# Patient Record
(demographics unavailable — no encounter records)

---

## 2024-10-14 NOTE — PLAN
[No] : No [Medication education provided] : Medication education provided. [Rationale for medication choices, possible risks/precautions, benefits, alternative treatment choices, and consequences of non-treatment discussed] : Rationale for medication choices, possible risks/precautions, benefits, alternative treatment choices, and consequences of non-treatment discussed with patient/family/caregiver  [FreeTextEntry5] : Psychoeducation and supportive therapy provided and discussed rationale for recommended med changes Continue venlafaxine extended release to 112.5 mg daily.  Educated patient of importance of remaining abstinent from drugs and alcohol.  Continue therapy-outside. Emergency procedures were discussed: pt. educated to call 911 or go to nearest ER for worsening of symptoms/suicidal/homicidal ideation.  RTC in 2.5-3 months or early as needed  Patient given opportunity to ask questions and their questions were answered, and they expressed understanding and agreement with above plan.  I stop checked: Reference #: [id number]  Practitioner Count: 0 Pharmacy Count: 0 Current Opioid Prescriptions: 0 Current Benzodiazepine Prescriptions: 0 Current Stimulant Prescriptions: 0   Patient Demographic Information (PDI)     PDI	First Name	Last Name	Birth Date	Gender	Street Address	[facility]	Zip Code A	Selina Martinez	03/07/1995	Female	[address] [facility]	[address] [place]	[facility]	03/07/1995	Female	[address] [facility]	[address]  Prescription Information    PDI Filter:   PDI	My Rx	Current Rx	Drug Type	Rx Written	Rx Dispensed	Drug	Quantity	Days Supply	Prescriber Name	Prescriber SANDRA #	Payment Method	Dispenser A	Y	N	S	04/29/2024	05/23/2024	dexmethylphenidate er 5 mg cap	21	21	Bianca Portillo	[id number]	Other	[id number] B	Y	N	S	01/29/2024	01/30/2024	dextroamp-amphet er 10 mg cap	15	15	Bianca Portillo	[id number]	Insurance	[id number] B	Y	N	S	11/21/2023	11/25/2023	concerta er 18 mg tablet	30	30	Bianca Portillo	[id number]	Insurance	[facility] #[id number]

## 2024-10-14 NOTE — HISTORY OF PRESENT ILLNESS
[FreeTextEntry1] : Med changes made on last visit: DC trazodone, and clonazepam (6 weeks pregnant), use antihistamine prn for insomnia Patient states that she is now 18 weeks pregnant and her due date is March 16.  Patient states that she is waiting for fetal maternal medicine visit for fetal anatomy scan before she shares with her work about the pregnancy. Patient reported that her nausea had gone down significantly and however she is "very dry".  Patient states that she is trying to drink a lot of water but she developed dandruff and instead of hair pulling now she is picking at her scalp.  Patient states that she is not particularly itching but it appears to be hair pulling replaced by skin picking and she reports that when she is at school or distracted by some things she does not pick at her scalp.  Patient reported that that she is trying to wear When she is not in school and tried to wear gloves but she could not keep them on because she needed to work and do other things.  Patient states that her therapist suggested some other sensory activities like getting beads etc. that she can have in her hand to replace the sensation.  Patient reported that she tried some sensory toys but that did not work but is going to try what her therapist suggested. Patient reported that she does feel some anxiety here and there but that she reported no excessive worrying or feeling overwhelmed and she reported also mood is stable very rarely she feels sad or depressed.  Patient denied pervasive sad mood, anhedonia, denied feeling hopeless or helpless and denied passive or active SI.  Patient reported that often her depression anxiety symptoms were triggered by interactions with her mother who is now in therapy and she feels that is helping their relationship. Patient reported that she is using doxylamine at night to help her with sleep. Side effects from meds: none Adherence to med regimen: Good ETOH: Denied excess Cannabis use/abuse: denied  Illicit drug use/abuse: denied Medical update since last visit: No new medical issues, no new medication other than dandruff Changes in psychosocial history since last visit: 1st pregnancy

## 2024-10-14 NOTE — REASON FOR VISIT
[Patient preference] : as per patient preference [Telehealth (audio & video) - Individual/Group] : This visit was provided via telehealth using real-time 2-way audio visual technology. [Medical Office: ([facility])___] : The provider was located at the medical office in [unfilled]. [Home] : The patient, [unfilled], was located at home, [unfilled], at the time of the visit. [Verbal consent obtained from patient/other participant(s)] : Verbal consent for telehealth/telephonic services obtained from patient/other participant(s) [Patient] : Patient [Patient's space is appropriate for telehealth and maintains privacy/confidentiality.] : Patient's space is appropriate for telehealth and maintains privacy/confidentiality. [Participant(s) identity verified] : Participant(s) identity verified. [FreeTextEntry1] : anxiety

## 2024-10-14 NOTE — PLAN
[No] : No [Medication education provided] : Medication education provided. [Rationale for medication choices, possible risks/precautions, benefits, alternative treatment choices, and consequences of non-treatment discussed] : Rationale for medication choices, possible risks/precautions, benefits, alternative treatment choices, and consequences of non-treatment discussed with patient/family/caregiver  [FreeTextEntry5] : Psychoeducation and supportive therapy provided and discussed rationale for recommended med changes Continue venlafaxine extended release to 112.5 mg daily.  Educated patient of importance of remaining abstinent from drugs and alcohol.  Continue therapy-outside. Emergency procedures were discussed: pt. educated to call 911 or go to nearest ER for worsening of symptoms/suicidal/homicidal ideation.  RTC in 2.5-3 months or early as needed  Patient given opportunity to ask questions and their questions were answered, and they expressed understanding and agreement with above plan.  I stop checked: Reference #: [id number]  Practitioner Count: 0 Pharmacy Count: 0 Current Opioid Prescriptions: 0 Current Benzodiazepine Prescriptions: 0 Current Stimulant Prescriptions: 0   Patient Demographic Information (PDI)     PDI	First Name	Last Name	Birth Date	Gender	Street Address	[facility]	Zip Code A	Selina Martinez	03/07/1995	Female	[address] [facility]	[address] [place]	[facility]	03/07/1995	Female	[address] [facility] [facility]	[address]  Prescription Information    PDI Filter:   PDI	My Rx	Current Rx	Drug Type	Rx Written	Rx Dispensed	Drug	Quantity	Days Supply	Prescriber Name	Prescriber SANDRA #	Payment Method	Dispenser A	Y	N	S	04/29/2024	05/23/2024	dexmethylphenidate er 5 mg cap	21	21	Bianca Portillo	[id number]	Other	[id number] B	Y	N	S	01/29/2024	01/30/2024	dextroamp-amphet er 10 mg cap	15	15	Bianca Portillo	[id number]	Insurance	[facility][id number] B	Y	N	S	11/21/2023	11/25/2023	concerta er 18 mg tablet	30	30	Bianca Portillo	[id number]	Insurance	[facility] #[id number]

## 2024-10-14 NOTE — DISCUSSION/SUMMARY
[FreeTextEntry1] : The patient is a 28 yo woman with hx of sx consistent with diagnosis of MDD, and DARIA, on Lexapro 5 mg/day with good effect, reports increased anxiety since NOV, 2021 since she started a job that she boateng snot like.  5/23/2022: Patient reports she continues to feel anxious, and no side effects from Lexapro, will increase dose.   6/21/2022: Patient reports slight reduced of anxiety sx and is hopeful that in summer and once she starts new job she will be less anxious. We will continue same dose of Lexapro and monitor for sx stability.   09/27/2022: Patient reported significant improvement of anxiety symptoms since last visit, did not need to increase the Lexapro dose from 10 to 15 mg as discussed previously.   1/18/2023: Patient reported that for the past 2 weeks she started feeling anxious and having some trouble sleeping and getting upset easily though triggered by recent interpersonal stressor it appears that some of the symptoms have been coming back and we discussed increasing the Lexapro dose could help reduce the intensity of these symptoms so she can continue to learn coping skills in therapy to manage the stressors.   3/22/2023: Patient reported increasing the Lexapro dose has helped with the anxiety symptoms she was experiencing and also she realized that triggers that are causing increasing the anxiety and that she is working with her therapist learning coping skills.  5/2/2023: Patient states that she felt she was doing okay with the last dose increase with the Lexapro but for the past 2 weeks anticipating the work she has to do and on the day of the art show how little time she has she has been feeling more overwhelmed and anxious  5/15/2023: Patient reported that she did not have Klonopin until yesterday and she took half a tablet without much benefit but also did not feel tired or fatigued with a half a tablet.  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  6/19/2023: Patient continues to remain depressed and anxious with the anxiety symptoms at 8 out of 10, 10 being the worst despite being on full dose of Lexapro 20 mg daily and buspirone 20 mg daily and at this time would warrant switching to a different medication.  8/7/2023: Patient reported improving symptoms of depression and anxiety and so far has been tolerating venlafaxine without adverse effects.   09/27/2023: The patient reported improving symptoms of depression and anxiety, no adverse effects from venlafaxine. she is still getting distracted and starts a task and either gets distracted when she interrupted by a student or when she sees another thing she moves on and forgets the previous task that she was doing. She reported she had trouble focusing since childhood and the Adult ADHD screen questionnaire she completed with therapist meets threshold for ADHD.   11/14/2023: Patient reported sustained improvement of symptoms of depression, anxiety symptoms fluctuate with interpersonal stressors and conflict and stress at work mostly the past week otherwise she has been doing good and also even in the past week she feels that she has been is able to use coping skills effectively to cope with the stressors and learn to set boundaries with her mother.  Patient reported she continues to have trouble focusing and gets distracted easily not completing tasks and also not focusing on conversations, Vyvanse was giving her headaches so she stopped taking it she also has TMJ pain will be getting an guard to help her with the pain.  12/20/2023: The patient states even with Concerta she experienced increased heart rate and is no longer taking it and prefers a trial with non-stimulant for ADHD, and she feels her depression and general anxiety symptoms are in good control.  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  Patient had increased heart rate with Concerta and prefer to use use a nonstimulant but not finding it effective, and agreeable to trial with Adderall.  2/27/2024: Patient reported that taking Adderall only once a day before she came down with sinus infection which has been continuous and not resolving so she did not want to take Adderall when she had headaches and nasal congestion and 1 day that she took she experienced a slight increase in heart rate but overall tolerated it well.  Patient's concerns about risk versus benefits of different medication since she is currently prescribed on the pregnancy were discussed and advised patient to think more about her preferences and risks and benefits and discussed those with the OB/GYN as well and we can plan med changes during pregnancy and postpartum period accordingly.  4/9/2024: Patient did not tolerate Adderall and stopped taking it.  Depression and anxiety symptoms are under good control with the current medications however she continues to have trouble focusing and would like to try another stimulant but states that when she is pregnant she would like to avoid that and understands the risks and benefits of taking other psychotropic medications during pregnancy.  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  8/7/2024: Patient is 6 weeks pregnant and experiencing nausea otherwise overall depression and anxiety symptoms continue to remain stable.  10/14/2024: The patient is now 18 weeks pregnant morning sickness improved overall depression anxiety symptoms continue to remain stable she reported hair pulling is now replaced by skin picking especially the scalp since she has developed some dandruff and we talked about different strategies including oncological but considering risk versus benefits advised patient to continue to work with her therapist and incorporate behavior changes to help manage these symptoms.

## 2024-10-14 NOTE — PHYSICAL EXAM
[None] : none [Average] : average [Cooperative] : cooperative [Euthymic] : euthymic [Clear] : clear [Linear/Goal Directed] : linear/goal directed [None Reported] : none reported [WNL] : within normal limits [de-identified] : full range  [FreeTextEntry7] : No SI/HI

## 2024-10-14 NOTE — PHYSICAL EXAM
[None] : none [Average] : average [Cooperative] : cooperative [Euthymic] : euthymic [Clear] : clear [Linear/Goal Directed] : linear/goal directed [None Reported] : none reported [WNL] : within normal limits [de-identified] : full range  [FreeTextEntry7] : No SI/HI

## 2024-10-14 NOTE — DISCUSSION/SUMMARY
[FreeTextEntry1] : The patient is a 26 yo woman with hx of sx consistent with diagnosis of MDD, and DARIA, on Lexapro 5 mg/day with good effect, reports increased anxiety since NOV, 2021 since she started a job that she boateng snot like.  5/23/2022: Patient reports she continues to feel anxious, and no side effects from Lexapro, will increase dose.   6/21/2022: Patient reports slight reduced of anxiety sx and is hopeful that in summer and once she starts new job she will be less anxious. We will continue same dose of Lexapro and monitor for sx stability.   09/27/2022: Patient reported significant improvement of anxiety symptoms since last visit, did not need to increase the Lexapro dose from 10 to 15 mg as discussed previously.   1/18/2023: Patient reported that for the past 2 weeks she started feeling anxious and having some trouble sleeping and getting upset easily though triggered by recent interpersonal stressor it appears that some of the symptoms have been coming back and we discussed increasing the Lexapro dose could help reduce the intensity of these symptoms so she can continue to learn coping skills in therapy to manage the stressors.   3/22/2023: Patient reported increasing the Lexapro dose has helped with the anxiety symptoms she was experiencing and also she realized that triggers that are causing increasing the anxiety and that she is working with her therapist learning coping skills.  5/2/2023: Patient states that she felt she was doing okay with the last dose increase with the Lexapro but for the past 2 weeks anticipating the work she has to do and on the day of the art show how little time she has she has been feeling more overwhelmed and anxious  5/15/2023: Patient reported that she did not have Klonopin until yesterday and she took half a tablet without much benefit but also did not feel tired or fatigued with a half a tablet.  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  6/19/2023: Patient continues to remain depressed and anxious with the anxiety symptoms at 8 out of 10, 10 being the worst despite being on full dose of Lexapro 20 mg daily and buspirone 20 mg daily and at this time would warrant switching to a different medication.  8/7/2023: Patient reported improving symptoms of depression and anxiety and so far has been tolerating venlafaxine without adverse effects.   09/27/2023: The patient reported improving symptoms of depression and anxiety, no adverse effects from venlafaxine. she is still getting distracted and starts a task and either gets distracted when she interrupted by a student or when she sees another thing she moves on and forgets the previous task that she was doing. She reported she had trouble focusing since childhood and the Adult ADHD screen questionnaire she completed with therapist meets threshold for ADHD.   11/14/2023: Patient reported sustained improvement of symptoms of depression, anxiety symptoms fluctuate with interpersonal stressors and conflict and stress at work mostly the past week otherwise she has been doing good and also even in the past week she feels that she has been is able to use coping skills effectively to cope with the stressors and learn to set boundaries with her mother.  Patient reported she continues to have trouble focusing and gets distracted easily not completing tasks and also not focusing on conversations, Vyvanse was giving her headaches so she stopped taking it she also has TMJ pain will be getting an guard to help her with the pain.  12/20/2023: The patient states even with Concerta she experienced increased heart rate and is no longer taking it and prefers a trial with non-stimulant for ADHD, and she feels her depression and general anxiety symptoms are in good control.  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  Patient had increased heart rate with Concerta and prefer to use use a nonstimulant but not finding it effective, and agreeable to trial with Adderall.  2/27/2024: Patient reported that taking Adderall only once a day before she came down with sinus infection which has been continuous and not resolving so she did not want to take Adderall when she had headaches and nasal congestion and 1 day that she took she experienced a slight increase in heart rate but overall tolerated it well.  Patient's concerns about risk versus benefits of different medication since she is currently prescribed on the pregnancy were discussed and advised patient to think more about her preferences and risks and benefits and discussed those with the OB/GYN as well and we can plan med changes during pregnancy and postpartum period accordingly.  4/9/2024: Patient did not tolerate Adderall and stopped taking it.  Depression and anxiety symptoms are under good control with the current medications however she continues to have trouble focusing and would like to try another stimulant but states that when she is pregnant she would like to avoid that and understands the risks and benefits of taking other psychotropic medications during pregnancy.  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  8/7/2024: Patient is 6 weeks pregnant and experiencing nausea otherwise overall depression and anxiety symptoms continue to remain stable.  10/14/2024: The patient is now 18 weeks pregnant morning sickness improved overall depression anxiety symptoms continue to remain stable she reported hair pulling is now replaced by skin picking especially the scalp since she has developed some dandruff and we talked about different strategies including oncological but considering risk versus benefits advised patient to continue to work with her therapist and incorporate behavior changes to help manage these symptoms.

## 2024-10-14 NOTE — REASON FOR VISIT
[Patient preference] : as per patient preference [Telehealth (audio & video) - Individual/Group] : This visit was provided via telehealth using real-time 2-way audio visual technology. [Medical Office: ([facility])___] : The provider was located at the medical office in [unfilled]. [Home] : The patient, [unfilled], was located at home, [unfilled], at the time of the visit. [Verbal consent obtained from patient/other participant(s)] : Verbal consent for telehealth/telephonic services obtained from patient/other participant(s) [Patient] : Patient [Patient's space is appropriate for telehealth and maintains privacy/confidentiality.] : Patient's space is appropriate for telehealth and maintains privacy/confidentiality. [Participant(s) identity verified] : Participant(s) identity verified. [FreeTextEntry1] : anxiety

## 2024-12-02 NOTE — PHYSICAL EXAM
[None] : none [Average] : average [Cooperative] : cooperative [Euthymic] : euthymic [Clear] : clear [Linear/Goal Directed] : linear/goal directed [None Reported] : none reported [WNL] : within normal limits [de-identified] : full range  [FreeTextEntry7] : No SI/HI

## 2024-12-02 NOTE — HISTORY OF PRESENT ILLNESS
[FreeTextEntry1] : Med changes made on last visit: Patient is using doxylamine-antihistamine for insomnia during pregnancy.  Patient reports that she is now 25 weeks pregnant, due date is March 16.  Patient reported that she is overall doing.  She denied pervasive sad mood or anhedonia.  Patient reports that she still continues to pick at her scalp and this is more at home not so much at work when she is distracted so she is trying different ways to keep her hands busy not always working but continues to speak with her therapist.  Patient reported that lately her appetite is different she gets full easily but also feels hungry again.  Patient reported her sleep is okay and she is using antihistamine doxylamine with good effect.  Patient states that the stress at work is about the same no major changes and reports that for Thanksgiving mother went to spend holiday weekend with the sister she ended up going to her friend's house and felt better not having to do holiday with the family.  Patient reported that she does feel some anxiety here and there but that she reported no excessive worrying or feeling overwhelmed and she reported also mood is stable very rarely she feels sad or depressed.  Patient denied pervasive sad mood, anhedonia, denied feeling hopeless or helpless and denied passive or active SI.  Patient reported that often her depression anxiety symptoms were triggered by interactions with her mother who is now in therapy, and she feels that is helping their relationship. Patient reported that she is using doxylamine at night to help her with sleep. Side effects from meds: none Adherence to med regimen: Good ETOH: Denied excess Cannabis use/abuse: denied  Illicit drug use/abuse: denied Medical update since last visit: No new medical issues, no new medication other than dandruff Changes in psychosocial history since last visit: 1st pregnancy

## 2024-12-02 NOTE — REASON FOR VISIT
[Patient preference] : as per patient preference [Telehealth (audio & video) - Individual/Group] : This visit was provided via telehealth using real-time 2-way audio visual technology. [Medical Office: ([facility])___] : The provider was located at the medical office in [unfilled]. [Patient's space is appropriate for telehealth and maintains privacy/confidentiality.] : Patient's space is appropriate for telehealth and maintains privacy/confidentiality. [Participant(s) identity verified] : Participant(s) identity verified. [Verbal consent obtained from patient/other participant(s)] : Verbal consent for telehealth/telephonic services obtained from patient/other participant(s) [Patient] : Patient [Other Location: e.g. Home (Enter Location, City,State)___] : The patient, [unfilled], was located at [unfilled] at the time of the visit. [FreeTextEntry1] : anxiety

## 2024-12-02 NOTE — DISCUSSION/SUMMARY
[FreeTextEntry1] : The patient is a 26 yo woman with hx of sx consistent with diagnosis of MDD, and DARIA, on Lexapro 5 mg/day with good effect, reports increased anxiety since NOV, 2021 since she started a job that she boateng snot like.  5/23/2022: Patient reports she continues to feel anxious, and no side effects from Lexapro, will increase dose.   6/21/2022: Patient reports slight reduced of anxiety sx and is hopeful that in summer and once she starts new job she will be less anxious. We will continue same dose of Lexapro and monitor for sx stability.   09/27/2022: Patient reported significant improvement of anxiety symptoms since last visit, did not need to increase the Lexapro dose from 10 to 15 mg as discussed previously.   1/18/2023: Patient reported that for the past 2 weeks she started feeling anxious and having some trouble sleeping and getting upset easily though triggered by recent interpersonal stressor it appears that some of the symptoms have been coming back and we discussed increasing the Lexapro dose could help reduce the intensity of these symptoms so she can continue to learn coping skills in therapy to manage the stressors.   3/22/2023: Patient reported increasing the Lexapro dose has helped with the anxiety symptoms she was experiencing and also she realized that triggers that are causing increasing the anxiety and that she is working with her therapist learning coping skills.  5/2/2023: Patient states that she felt she was doing okay with the last dose increase with the Lexapro but for the past 2 weeks anticipating the work she has to do and on the day of the art show how little time she has she has been feeling more overwhelmed and anxious  5/15/2023: Patient reported that she did not have Klonopin until yesterday and she took half a tablet without much benefit but also did not feel tired or fatigued with a half a tablet.  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  6/19/2023: Patient continues to remain depressed and anxious with the anxiety symptoms at 8 out of 10, 10 being the worst despite being on full dose of Lexapro 20 mg daily and buspirone 20 mg daily and at this time would warrant switching to a different medication.  8/7/2023: Patient reported improving symptoms of depression and anxiety and so far has been tolerating venlafaxine without adverse effects.   09/27/2023: The patient reported improving symptoms of depression and anxiety, no adverse effects from venlafaxine. she is still getting distracted and starts a task and either gets distracted when she interrupted by a student or when she sees another thing she moves on and forgets the previous task that she was doing. She reported she had trouble focusing since childhood and the Adult ADHD screen questionnaire she completed with therapist meets threshold for ADHD.   11/14/2023: Patient reported sustained improvement of symptoms of depression, anxiety symptoms fluctuate with interpersonal stressors and conflict and stress at work mostly the past week otherwise she has been doing good and also even in the past week she feels that she has been is able to use coping skills effectively to cope with the stressors and learn to set boundaries with her mother.  Patient reported she continues to have trouble focusing and gets distracted easily not completing tasks and also not focusing on conversations, Vyvanse was giving her headaches so she stopped taking it she also has TMJ pain will be getting an guard to help her with the pain.  12/20/2023: The patient states even with Concerta she experienced increased heart rate and is no longer taking it and prefers a trial with non-stimulant for ADHD, and she feels her depression and general anxiety symptoms are in good control.  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  Patient had increased heart rate with Concerta and prefer to use use a nonstimulant but not finding it effective, and agreeable to trial with Adderall.  2/27/2024: Patient reported that taking Adderall only once a day before she came down with sinus infection which has been continuous and not resolving so she did not want to take Adderall when she had headaches and nasal congestion and 1 day that she took she experienced a slight increase in heart rate but overall tolerated it well.  Patient's concerns about risk versus benefits of different medication since she is currently prescribed on the pregnancy were discussed and advised patient to think more about her preferences and risks and benefits and discussed those with the OB/GYN as well and we can plan med changes during pregnancy and postpartum period accordingly.  4/9/2024: Patient did not tolerate Adderall and stopped taking it.  Depression and anxiety symptoms are under good control with the current medications however she continues to have trouble focusing and would like to try another stimulant but states that when she is pregnant she would like to avoid that and understands the risks and benefits of taking other psychotropic medications during pregnancy.  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  8/7/2024: Patient is 6 weeks pregnant and experiencing nausea otherwise overall depression and anxiety symptoms continue to remain stable.  10/14/2024: The patient is now 18 weeks pregnant morning sickness improved overall depression anxiety symptoms continue to remain stable she reported hair pulling is now replaced by skin picking especially the scalp since she has developed some dandruff and we talked about different strategies including oncological but considering risk versus benefits advised patient to continue to work with her therapist and incorporate behavior changes to help manage these symptoms.  12/2/2024: Patient is 25 weeks pregnant.  Reports there is continuous skin picking of the scalp no change from last time continuing to work with the therapist and using different strategies to distract herself from skin picking.

## 2024-12-02 NOTE — PLAN
[No] : No [Medication education provided] : Medication education provided. [Rationale for medication choices, possible risks/precautions, benefits, alternative treatment choices, and consequences of non-treatment discussed] : Rationale for medication choices, possible risks/precautions, benefits, alternative treatment choices, and consequences of non-treatment discussed with patient/family/caregiver  [FreeTextEntry5] : Psychoeducation and supportive therapy provided and discussed rationale for recommended med changes Continue venlafaxine extended release to 112.5 mg daily. Advise patient to consult with OBGYN at 28 prenatal visit and consider lowering dose to 75 mg/day after 30 weeks.  Educated patient of importance of remaining abstinent from drugs and alcohol.  Continue therapy-outside. Emergency procedures were discussed: pt. educated to call 911 or go to nearest ER for worsening of symptoms/suicidal/homicidal ideation.  RTC in 6-8 weeks or early as needed  Patient given opportunity to ask questions and their questions were answered, and they expressed understanding and agreement with above plan.  I stop checked:  Reference #: [id number]  Practitioner Count: 0 Pharmacy Count: 0 Current Opioid Prescriptions: 0 Current Benzodiazepine Prescriptions: 0 Current Stimulant Prescriptions: 0   Patient Demographic Information (PDI)     PDI	First Name	Last Name	Birth Date	Gender	Street Address	[facility]	Zip Code A	Selina Algypsy	03/07/1995	Female	[address] [facility]	[address] [address]	Selina Algypsy	03/07/1995	Female	[address] [facility]	[address]  Prescription Information    PDI Filter:   PDI	My Rx	Current Rx	Drug Type	Rx Written	Rx Dispensed	Drug	Quantity	Days Supply	Prescriber Name	Prescriber SANDRA #	Payment Method	Dispenser A	Y	N	S	04/29/2024	05/23/2024	dexmethylphenidate er 5 mg cap	21	21	Bianca Portillo	[id number]	Other	[id number]	Y	N	S	01/29/2024	01/30/2024	dextroamp-amphet er 10 mg cap	15	15	Bianca Portillo	[id number]	Insurance	[id number]

## 2025-01-20 NOTE — DISCUSSION/SUMMARY
[FreeTextEntry1] : The patient is a 26 yo woman with hx of sx consistent with diagnosis of MDD, and DARIA, on Lexapro 5 mg/day with good effect, reports increased anxiety since NOV, 2021 since she started a job that she boateng snot like.  5/23/2022: Patient reports she continues to feel anxious, and no side effects from Lexapro, will increase dose.   6/21/2022: Patient reports slight reduced of anxiety sx and is hopeful that in summer and once she starts new job she will be less anxious. We will continue same dose of Lexapro and monitor for sx stability.   09/27/2022: Patient reported significant improvement of anxiety symptoms since last visit, did not need to increase the Lexapro dose from 10 to 15 mg as discussed previously.   1/18/2023: Patient reported that for the past 2 weeks she started feeling anxious and having some trouble sleeping and getting upset easily though triggered by recent interpersonal stressor it appears that some of the symptoms have been coming back and we discussed increasing the Lexapro dose could help reduce the intensity of these symptoms so she can continue to learn coping skills in therapy to manage the stressors.   3/22/2023: Patient reported increasing the Lexapro dose has helped with the anxiety symptoms she was experiencing and also she realized that triggers that are causing increasing the anxiety and that she is working with her therapist learning coping skills.  5/2/2023: Patient states that she felt she was doing okay with the last dose increase with the Lexapro but for the past 2 weeks anticipating the work she has to do and on the day of the art show how little time she has she has been feeling more overwhelmed and anxious  5/15/2023: Patient reported that she did not have Klonopin until yesterday and she took half a tablet without much benefit but also did not feel tired or fatigued with a half a tablet.  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  6/19/2023: Patient continues to remain depressed and anxious with the anxiety symptoms at 8 out of 10, 10 being the worst despite being on full dose of Lexapro 20 mg daily and buspirone 20 mg daily and at this time would warrant switching to a different medication.  8/7/2023: Patient reported improving symptoms of depression and anxiety and so far has been tolerating venlafaxine without adverse effects.   09/27/2023: The patient reported improving symptoms of depression and anxiety, no adverse effects from venlafaxine. she is still getting distracted and starts a task and either gets distracted when she interrupted by a student or when she sees another thing she moves on and forgets the previous task that she was doing. She reported she had trouble focusing since childhood and the Adult ADHD screen questionnaire she completed with therapist meets threshold for ADHD.   11/14/2023: Patient reported sustained improvement of symptoms of depression, anxiety symptoms fluctuate with interpersonal stressors and conflict and stress at work mostly the past week otherwise she has been doing good and also even in the past week she feels that she has been is able to use coping skills effectively to cope with the stressors and learn to set boundaries with her mother.  Patient reported she continues to have trouble focusing and gets distracted easily not completing tasks and also not focusing on conversations, Vyvanse was giving her headaches so she stopped taking it she also has TMJ pain will be getting an guard to help her with the pain.  12/20/2023: The patient states even with Concerta she experienced increased heart rate and is no longer taking it and prefers a trial with non-stimulant for ADHD, and she feels her depression and general anxiety symptoms are in good control.  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  Patient had increased heart rate with Concerta and prefer to use use a nonstimulant but not finding it effective, and agreeable to trial with Adderall.  2/27/2024: Patient reported that taking Adderall only once a day before she came down with sinus infection which has been continuous and not resolving so she did not want to take Adderall when she had headaches and nasal congestion and 1 day that she took she experienced a slight increase in heart rate but overall tolerated it well.  Patient's concerns about risk versus benefits of different medication since she is currently prescribed on the pregnancy were discussed and advised patient to think more about her preferences and risks and benefits and discussed those with the OB/GYN as well and we can plan med changes during pregnancy and postpartum period accordingly.  4/9/2024: Patient did not tolerate Adderall and stopped taking it.  Depression and anxiety symptoms are under good control with the current medications however she continues to have trouble focusing and would like to try another stimulant but states that when she is pregnant she would like to avoid that and understands the risks and benefits of taking other psychotropic medications during pregnancy.  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  8/7/2024: Patient is 6 weeks pregnant and experiencing nausea otherwise overall depression and anxiety symptoms continue to remain stable.  10/14/2024: The patient is now 18 weeks pregnant morning sickness improved overall depression anxiety symptoms continue to remain stable she reported hair pulling is now replaced by skin picking especially the scalp since she has developed some dandruff and we talked about different strategies including oncological but considering risk versus benefits advised patient to continue to work with her therapist and incorporate behavior changes to help manage these symptoms.  12/2/2024: Patient is 25 weeks pregnant.  Reports there is continuous skin picking of the scalp no change from last time continuing to work with the therapist and using different strategies to distract herself from skin picking.  1/17/2025: Patient's overall status is relatively stable with the exception of heightened anxiety particularly centered around scratching the scalp. Current medication is Doxylamine for insomnia and venlafaxine .5 mg/day. We discussed the possibility of either maintaining venlafaxine postpartum depending on anxiety symptoms or changing to Zoloft should the patient decide to breastfeed, as this is not secreted in breast milk. She will review with  and MFM specialist to decide if to continue same, vs switch to Zoloft so she can breastfeed her child without exposing to medications in breastmilk.

## 2025-01-20 NOTE — PLAN
[No] : No [Medication education provided] : Medication education provided. [Rationale for medication choices, possible risks/precautions, benefits, alternative treatment choices, and consequences of non-treatment discussed] : Rationale for medication choices, possible risks/precautions, benefits, alternative treatment choices, and consequences of non-treatment discussed with patient/family/caregiver  [FreeTextEntry5] : Psychoeducation provided: addressed the potential need for a medication change postpartum depending on symptoms experienced and whether the patient decides to breastfeed or not, as current medication is secreted in breastmilk. Supportive therapy: Continued supportive therapy with a focus on managing the scalp scratching behavior. Continue venlafaxine extended release to 112.5 mg daily.  Educated patient of importance of remaining abstinent from drugs and alcohol.  Continue therapy-outside. Emergency procedures were discussed: pt. educated to call 911 or go to nearest ER for worsening of symptoms/suicidal/homicidal ideation.  A follow-up appointment was suggested to occur in four to five weeks. Patient given opportunity to ask questions and their questions were answered, and they expressed understanding and agreement with above plan.  I stop checked:  Reference #: [id number]  Practitioner Count: 0 Pharmacy Count: 0 Current Opioid Prescriptions: 0 Current Benzodiazepine Prescriptions: 0 Current Stimulant Prescriptions: 0   Patient Demographic Information (PDI)     PDI	First Name	Last Name	Birth Date	Gender	Street Address	[facility] A	Selina	Adriana	03/07/1995	Female	[address] [facility]	[address] [address]	Selina	Adriana	03/07/1995	Female	[address] [facility]	[address]  Prescription Information    PDI Filter:   PDI	My Rx	Current Rx	Drug Type	Rx Written	Rx Dispensed	Drug	Quantity	Days Supply	Prescriber Name	Prescriber SANDRA #	Payment Method	Dispenser A	Y	N	S	04/29/2024	05/23/2024	dexmethylphenidate er 5 mg cap	21	21	Bianca Portillo	[id number]	Other	[id number]	Y	N	S	01/29/2024	01/30/2024	dextroamp-amphet er 10 mg cap	15	15	Bianca Portillo	[id number]	Insurance	[facility][id number]

## 2025-01-20 NOTE — REASON FOR VISIT
[Patient preference] : as per patient preference [Telehealth (audio & video) - Individual/Group] : This visit was provided via telehealth using real-time 2-way audio visual technology. [Medical Office: ([facility])___] : The provider was located at the medical office in [unfilled]. [Other Location: e.g. Home (Enter Location, City,State)___] : The patient, [unfilled], was located at [unfilled] at the time of the visit. [Patient's space is appropriate for telehealth and maintains privacy/confidentiality.] : Patient's space is appropriate for telehealth and maintains privacy/confidentiality. [Participant(s) identity verified] : Participant(s) identity verified. [Verbal consent obtained from patient/other participant(s)] : Verbal consent for telehealth/telephonic services obtained from patient/other participant(s) [Patient] : Patient [FreeTextEntry1] : anxiety

## 2025-01-20 NOTE — PHYSICAL EXAM
[None] : none [Average] : average [Cooperative] : cooperative [Euthymic] : euthymic [Clear] : clear [Linear/Goal Directed] : linear/goal directed [None Reported] : none reported [WNL] : within normal limits [de-identified] : full range  [FreeTextEntry7] : No SI/HI

## 2025-01-20 NOTE — PHYSICAL EXAM
[None] : none [Average] : average [Cooperative] : cooperative [Euthymic] : euthymic [Clear] : clear [Linear/Goal Directed] : linear/goal directed [None Reported] : none reported [WNL] : within normal limits [de-identified] : full range  [FreeTextEntry7] : No SI/HI

## 2025-01-20 NOTE — REASON FOR VISIT
[Patient preference] : as per patient preference [Telehealth (audio & video) - Individual/Group] : This visit was provided via telehealth using real-time 2-way audio visual technology. [Medical Office: ([facility])___] : The provider was located at the medical office in [unfilled]. [Other Location: e.g. Home (Enter Location, City,State)___] : The patient, [unfilled], was located at [unfilled] at the time of the visit. [Patient's space is appropriate for telehealth and maintains privacy/confidentiality.] : Patient's space is appropriate for telehealth and maintains privacy/confidentiality. [Participant(s) identity verified] : Participant(s) identity verified. [Verbal consent obtained from patient/other participant(s)] : Verbal consent for telehealth/telephonic services obtained from patient/other participant(s) [Patient] : Patient [FreeTextEntry1] : anxiety

## 2025-01-20 NOTE — PLAN
[No] : No [Medication education provided] : Medication education provided. [Rationale for medication choices, possible risks/precautions, benefits, alternative treatment choices, and consequences of non-treatment discussed] : Rationale for medication choices, possible risks/precautions, benefits, alternative treatment choices, and consequences of non-treatment discussed with patient/family/caregiver  [FreeTextEntry5] : Psychoeducation provided: addressed the potential need for a medication change postpartum depending on symptoms experienced and whether the patient decides to breastfeed or not, as current medication is secreted in breastmilk. Supportive therapy: Continued supportive therapy with a focus on managing the scalp scratching behavior. Continue venlafaxine extended release to 112.5 mg daily.  Educated patient of importance of remaining abstinent from drugs and alcohol.  Continue therapy-outside. Emergency procedures were discussed: pt. educated to call 911 or go to nearest ER for worsening of symptoms/suicidal/homicidal ideation.  A follow-up appointment was suggested to occur in four to five weeks. Patient given opportunity to ask questions and their questions were answered, and they expressed understanding and agreement with above plan.  I stop checked:  Reference #: [id number]  Practitioner Count: 0 Pharmacy Count: 0 Current Opioid Prescriptions: 0 Current Benzodiazepine Prescriptions: 0 Current Stimulant Prescriptions: 0   Patient Demographic Information (PDI)     PDI	First Name	Last Name	Birth Date	Gender	Street Address	[facility]	Selina	Adriana	03/07/1995	Female	[address]	[address] [address]	Selina	Adriana	03/07/1995	Female	[address]	[address]  Prescription Information    PDI Filter:   PDI	My Rx	Current Rx	Drug Type	Rx Written	Rx Dispensed	Drug	Quantity	Days Supply	Prescriber Name	Prescriber SANDAR #	Payment Method	Dispenser A	Y	N	S	04/29/2024	05/23/2024	dexmethylphenidate er 5 mg cap	21	21	Bianca Portillo	[id number]	Other	[id number] [address]	Y	N	S	01/29/2024	01/30/2024	dextroamp-amphet er 10 mg cap	15	15	Bianca Portillo	[id number]	Insurance	[facility] #[id number]

## 2025-01-20 NOTE — HISTORY OF PRESENT ILLNESS
[FreeTextEntry1] : The patient states this Sunday will complete 32 weeks gestation, next prenatal visit scheduled for Jan 29th.  She reported her mood is okay with no significant fluctuations. The patient is experiencing heightened anxiety and increased scratching on the scalp and causing wounds, a behavior that began with the pregnancy. Previous habits of twirling hair were dropped. Behavioral therapeutic techniques have seen limited success. The patient is experiencing sleep disruption primarily due to physical discomfort. The patient continues to use doxylamine to assist her to fall asleep. Appetite- better. Improved nausea. Energy-fair. No panic attacks, no passive/active SI/HI.

## 2025-01-28 NOTE — PHYSICAL EXAM
[EOMI] : extra ocular movement intact [Sclera nonicteric] : sclera nonicteric [Supple] : supple [No Supraclavicular Adenopathy] : no supraclavicular adenopathy [No Cervical Adenopathy] : no cervical adenopathy [No Thyromegaly] : no thyromegaly [Clear to Auscultation Bilat] : clear to auscultation bilaterally [Examined in the supine and seated position] : examined in the supine and seated position [No dominant masses] : no dominant masses in right breast  [No Nipple Retraction] : no left nipple retraction [No Nipple Discharge] : no left nipple discharge [No Axillary Lymphadenopathy] : no left axillary lymphadenopathy [Soft] : abdomen soft [Not Tender] : non-tender [de-identified] : Circumareolar scar. 1.5 oval mobile mass 4-5:00 N3. [de-identified] : Gravid

## 2025-01-28 NOTE — PHYSICAL EXAM
[EOMI] : extra ocular movement intact [Sclera nonicteric] : sclera nonicteric [Supple] : supple [No Supraclavicular Adenopathy] : no supraclavicular adenopathy [No Cervical Adenopathy] : no cervical adenopathy [No Thyromegaly] : no thyromegaly [Clear to Auscultation Bilat] : clear to auscultation bilaterally [Examined in the supine and seated position] : examined in the supine and seated position [No dominant masses] : no dominant masses in right breast  [No Nipple Retraction] : no left nipple retraction [No Nipple Discharge] : no left nipple discharge [No Axillary Lymphadenopathy] : no left axillary lymphadenopathy [Soft] : abdomen soft [Not Tender] : non-tender [de-identified] : Circumareolar scar. 1.5 oval mobile mass 4-5:00 N3. [de-identified] : Gravid

## 2025-01-28 NOTE — DATA REVIEWED
[FreeTextEntry1] : Bilateral breast MRI 3/19/2024:  No MRI evidence of malignancy. Stable previously biopsied nodule left UIQ 12 mm.  Bilateral breast ultrasound 3/29/2024: Right 1:00 N5 5 mm cyst.  Left 11:00 N3  [id number] mm fibroadenoma.  10N5 3x3x4 mm slightly smaller. 5N4 [id number] mm questionable prominent fat lobule (previous biopsy= FA change and PASH).  Follow-up in 6 months.

## 2025-01-28 NOTE — HISTORY OF PRESENT ILLNESS
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  She had a left breast fibroadenoma documented by core biopsy in 1/2019.  She was found to have a new left breast lump (4N3) on her routine exam in March 2023.  Core biopsy in 10/2023 showed fibroadenomatoid change and PASH.  She is 33 weeks pregnant with her first son.  She is going to try to nurse.  She wants to wait about 3 years for her next child.  She has no breast complaints.  She is scheduled for a breast ultrasound tomorrow.

## 2025-01-28 NOTE — DATA REVIEWED
[FreeTextEntry1] : Bilateral breast MRI 3/19/2024:  No MRI evidence of malignancy. Stable previously biopsied nodule left UIQ 12 mm.  Bilateral breast ultrasound 3/29/2024: Right 1:00 N5 5 mm cyst.  Left 11:00 N3  [id number] mm fibroadenoma.  10N5 3x3x4 mm slightly smaller. 5N4 [id number] mm questionable prominent fat lobule (previous biopsy= FA change and PASH).  Follow-up in 6 months.

## 2025-03-04 NOTE — HISTORY OF PRESENT ILLNESS
[FreeTextEntry1] : The patient states she is continuing to do well, and the pregnancy is progressing well, she is scheduled to get induced to deliver this coming Sunday night.  Patient reported that anxiety and depression symptoms continue to remain in good control and she denied any significant fluctuations with mood and anxiety.  Patient reported that she is still making some cuts on her scalp by skin picking but states this is less than a few months ago.  Patient reported that she is continuing to take doxylamine for insomnia and was told that she could continue taking it after the delivery.  Patient states that she plans to breast-feed and had discussed with her OB/GYN, and fetal medicine specialist and they both recommend that she continue taking her Effexor. Appetite- better. Improved nausea. Energy-fair. No panic attacks, no passive/active SI/HI.

## 2025-03-04 NOTE — REASON FOR VISIT
[Patient preference] : as per patient preference [Telehealth (audio & video) - Individual/Group] : This visit was provided via telehealth using real-time 2-way audio visual technology. [Medical Office: ([facility])___] : The provider was located at the medical office in [unfilled]. [Other Location: e.g. Home (Enter Location, City,State)___] : The patient, [unfilled], was located at [unfilled] at the time of the visit. [Patient's space is appropriate for telehealth and maintains privacy/confidentiality.] : Patient's space is appropriate for telehealth and maintains privacy/confidentiality. [Participant(s) identity verified] : Participant(s) identity verified. [Verbal consent obtained from patient/other participant(s)] : Verbal consent for telehealth/telephonic services obtained from patient/other participant(s) [Patient] : Patient [FreeTextEntry1] : anxiety

## 2025-03-04 NOTE — PLAN
[No] : No [Medication education provided] : Medication education provided. [Rationale for medication choices, possible risks/precautions, benefits, alternative treatment choices, and consequences of non-treatment discussed] : Rationale for medication choices, possible risks/precautions, benefits, alternative treatment choices, and consequences of non-treatment discussed with patient/family/caregiver  [FreeTextEntry5] : Psychoeducation provided:  Offered education about the potential risks of postpartum depression and anxiety. Supportive therapy: Continued supportive therapy with a focus on managing the scalp scratching behavior. Continue venlafaxine extended release to 112.5 mg daily.  Educated patient of importance of remaining abstinent from drugs and alcohol.  Continue therapy-outside. Emergency procedures were discussed: pt. educated to call 911 or go to nearest ER for worsening of symptoms/suicidal/homicidal ideation.  A follow-up appointment suggested to be scheduled in 6 weeks. Patient given opportunity to ask questions and their questions were answered, and they expressed understanding and agreement with above plan.  I stop not checked, no controlled substance Rx given today

## 2025-03-04 NOTE — PHYSICAL EXAM
[None] : none [Average] : average [Cooperative] : cooperative [Euthymic] : euthymic [Clear] : clear [Linear/Goal Directed] : linear/goal directed [None Reported] : none reported [WNL] : within normal limits [de-identified] : full range  [FreeTextEntry7] : No SI/HI

## 2025-03-04 NOTE — REASON FOR VISIT
[Patient preference] : as per patient preference [Telehealth (audio & video) - Individual/Group] : This visit was provided via telehealth using real-time 2-way audio visual technology. [Medical Office: ([facility])___] : The provider was located at the medical office in [unfilled]. [Other Location: e.g. Home (Enter Location, City,State)___] : The patient, [unfilled], was located at [unfilled] at the time of the visit. [Patient's space is appropriate for telehealth and maintains privacy/confidentiality.] : Patient's space is appropriate for telehealth and maintains privacy/confidentiality. [Participant(s) identity verified] : Participant(s) identity verified. [Verbal consent obtained from patient/other participant(s)] : Verbal consent for telehealth/telephonic services obtained from patient/other participant(s) [Patient] : Patient [FreeTextEntry1] : anxiety

## 2025-03-04 NOTE — DISCUSSION/SUMMARY
[FreeTextEntry1] : The patient is a 26 yo woman with hx of sx consistent with diagnosis of MDD, and DARIA, on Lexapro 5 mg/day with good effect, reports increased anxiety since NOV, 2021 since she started a job that she boateng snot like.  5/23/2022: Patient reports she continues to feel anxious, and no side effects from Lexapro, will increase dose.   6/21/2022: Patient reports slight reduced of anxiety sx and is hopeful that in summer and once she starts new job she will be less anxious. We will continue same dose of Lexapro and monitor for sx stability.   09/27/2022: Patient reported significant improvement of anxiety symptoms since last visit, did not need to increase the Lexapro dose from 10 to 15 mg as discussed previously.   1/18/2023: Patient reported that for the past 2 weeks she started feeling anxious and having some trouble sleeping and getting upset easily though triggered by recent interpersonal stressor it appears that some of the symptoms have been coming back and we discussed increasing the Lexapro dose could help reduce the intensity of these symptoms so she can continue to learn coping skills in therapy to manage the stressors.   3/22/2023: Patient reported increasing the Lexapro dose has helped with the anxiety symptoms she was experiencing and also she realized that triggers that are causing increasing the anxiety and that she is working with her therapist learning coping skills.  5/2/2023: Patient states that she felt she was doing okay with the last dose increase with the Lexapro but for the past 2 weeks anticipating the work she has to do and on the day of the art show how little time she has she has been feeling more overwhelmed and anxious  5/15/2023: Patient reported that she did not have Klonopin until yesterday and she took half a tablet without much benefit but also did not feel tired or fatigued with a half a tablet.  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  6/19/2023: Patient continues to remain depressed and anxious with the anxiety symptoms at 8 out of 10, 10 being the worst despite being on full dose of Lexapro 20 mg daily and buspirone 20 mg daily and at this time would warrant switching to a different medication.  8/7/2023: Patient reported improving symptoms of depression and anxiety and so far has been tolerating venlafaxine without adverse effects.   09/27/2023: The patient reported improving symptoms of depression and anxiety, no adverse effects from venlafaxine. she is still getting distracted and starts a task and either gets distracted when she interrupted by a student or when she sees another thing she moves on and forgets the previous task that she was doing. She reported she had trouble focusing since childhood and the Adult ADHD screen questionnaire she completed with therapist meets threshold for ADHD.   11/14/2023: Patient reported sustained improvement of symptoms of depression, anxiety symptoms fluctuate with interpersonal stressors and conflict and stress at work mostly the past week otherwise she has been doing good and also even in the past week she feels that she has been is able to use coping skills effectively to cope with the stressors and learn to set boundaries with her mother.  Patient reported she continues to have trouble focusing and gets distracted easily not completing tasks and also not focusing on conversations, Vyvanse was giving her headaches so she stopped taking it she also has TMJ pain will be getting an guard to help her with the pain.  12/20/2023: The patient states even with Concerta she experienced increased heart rate and is no longer taking it and prefers a trial with non-stimulant for ADHD, and she feels her depression and general anxiety symptoms are in good control.  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  Patient had increased heart rate with Concerta and prefer to use use a nonstimulant but not finding it effective, and agreeable to trial with Adderall.  2/27/2024: Patient reported that taking Adderall only once a day before she came down with sinus infection which has been continuous and not resolving so she did not want to take Adderall when she had headaches and nasal congestion and 1 day that she took she experienced a slight increase in heart rate but overall tolerated it well.  Patient's concerns about risk versus benefits of different medication since she is currently prescribed on the pregnancy were discussed and advised patient to think more about her preferences and risks and benefits and discussed those with the OB/GYN as well and we can plan med changes during pregnancy and postpartum period accordingly.  4/9/2024: Patient did not tolerate Adderall and stopped taking it.  Depression and anxiety symptoms are under good control with the current medications however she continues to have trouble focusing and would like to try another stimulant but states that when she is pregnant she would like to avoid that and understands the risks and benefits of taking other psychotropic medications during pregnancy.  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  8/7/2024: Patient is 6 weeks pregnant and experiencing nausea otherwise overall depression and anxiety symptoms continue to remain stable.  10/14/2024: The patient is now 18 weeks pregnant morning sickness improved overall depression anxiety symptoms continue to remain stable she reported hair pulling is now replaced by skin picking especially the scalp since she has developed some dandruff and we talked about different strategies including oncological but considering risk versus benefits advised patient to continue to work with her therapist and incorporate behavior changes to help manage these symptoms.  12/2/2024: Patient is 25 weeks pregnant.  Reports there is continuous skin picking of the scalp no change from last time continuing to work with the therapist and using different strategies to distract herself from skin picking.  1/17/2025: Patient's overall status is relatively stable with the exception of heightened anxiety particularly centered around scratching the scalp. Current medication is Doxylamine for insomnia and venlafaxine .5 mg/day. We discussed the possibility of either maintaining venlafaxine postpartum depending on anxiety symptoms or changing to Zoloft should the patient decide to breastfeed, as this is not secreted in breast milk. She will review with  and MFM specialist to decide if to continue same, vs switch to Zoloft so she can breastfeed her child without exposing to medications in breastmilk.   3/4/2025: The patient's pregnancy is near its end and she has maintained a good level of stability whilst expressing mild anxiety. As postpartum depression/anxiety is a potential risk, the continuation of the Venlafaxine (with approval from the OB-GYN) seems a suitable approach currently and patient also wishes to continue venlafaxine and not make any medication changes.

## 2025-03-04 NOTE — PHYSICAL EXAM
[None] : none [Average] : average [Cooperative] : cooperative [Euthymic] : euthymic [Clear] : clear [Linear/Goal Directed] : linear/goal directed [None Reported] : none reported [WNL] : within normal limits [de-identified] : full range  [FreeTextEntry7] : No SI/HI

## 2025-04-09 NOTE — HEALTH RISK ASSESSMENT
[Good] : ~his/her~  mood as  good [No] : In the past 12 months have you used drugs other than those required for medical reasons? No [No falls in past year] : Patient reported no falls in the past year [I have developed a follow-up plan documented below in the note.] : I have developed a follow-up plan documented below in the note. [Never] : Never [None] : None [With Significant Other] : lives with significant other [Employed] : employed [] :  [Feels Safe at Home] : Feels safe at home [Fully functional (bathing, dressing, toileting, transferring, walking, feeding)] : Fully functional (bathing, dressing, toileting, transferring, walking, feeding) [Fully functional (using the telephone, shopping, preparing meals, housekeeping, doing laundry, using] : Fully functional and needs no help or supervision to perform IADLs (using the telephone, shopping, preparing meals, housekeeping, doing laundry, using transportation, managing medications and managing finances) [Smoke Detector] : smoke detector [Carbon Monoxide Detector] : carbon monoxide detector [Seat Belt] :  uses seat belt [de-identified] : active [de-identified] : regular [Reports changes in hearing] : Reports no changes in hearing [Reports changes in vision] : Reports no changes in vision [Reports changes in dental health] : Reports no changes in dental health

## 2025-04-09 NOTE — HISTORY OF PRESENT ILLNESS
[FreeTextEntry1] : Annual Physical [de-identified] : ARAVIND HALL is a 31 yo woman with history of anxiety/depression here for a physical. She has been well overall.   Had a baby boy 4 weeks ago, recovering well.  Tired  Gyn utd.    Derm due dr gregory.  The patient is  with one son.  She works as an . She would have no difficulty walking 4 to 6 blocks or 2 flights of stairs. Tdap utd.

## 2025-04-09 NOTE — ASSESSMENT
[Vaccines Reviewed] : Immunizations reviewed today. Please see immunization details in the vaccine log within the immunization flowsheet.  [FreeTextEntry1] : HCM Labs reviewed with pt Gyn when due Derm due Tdap utd Support offered f/u prn or 1 year

## 2025-04-09 NOTE — PHYSICAL EXAM
[No Acute Distress] : no acute distress [Well Nourished] : well nourished [Well Developed] : well developed [Well-Appearing] : well-appearing [Normal Sclera/Conjunctiva] : normal sclera/conjunctiva [EOMI] : extraocular movements intact [Normal Outer Ear/Nose] : the outer ears and nose were normal in appearance [Normal Oropharynx] : the oropharynx was normal [Normal TMs] : both tympanic membranes were normal [Normal Nasal Mucosa] : the nasal mucosa was normal [No Lymphadenopathy] : no lymphadenopathy [Supple] : supple [Thyroid Normal, No Nodules] : the thyroid was normal and there were no nodules present [No Respiratory Distress] : no respiratory distress  [No Accessory Muscle Use] : no accessory muscle use [Clear to Auscultation] : lungs were clear to auscultation bilaterally [Normal Rate] : normal rate  [Regular Rhythm] : with a regular rhythm [Normal S1, S2] : normal S1 and S2 [No Edema] : there was no peripheral edema [Soft] : abdomen soft [Non Tender] : non-tender [Non-distended] : non-distended [Normal Bowel Sounds] : normal bowel sounds [Normal Supraclavicular Nodes] : no supraclavicular lymphadenopathy [Normal Posterior Cervical Nodes] : no posterior cervical lymphadenopathy [Normal Anterior Cervical Nodes] : no anterior cervical lymphadenopathy [No CVA Tenderness] : no CVA  tenderness [Coordination Grossly Intact] : coordination grossly intact [No Focal Deficits] : no focal deficits [Normal Gait] : normal gait [Deep Tendon Reflexes (DTR)] : deep tendon reflexes were 2+ and symmetric [Speech Grossly Normal] : speech grossly normal [Memory Grossly Normal] : memory grossly normal [Normal Affect] : the affect was normal [Alert and Oriented x3] : oriented to person, place, and time [Normal Mood] : the mood was normal [Normal Insight/Judgement] : insight and judgment were intact

## 2025-04-30 NOTE — DISCUSSION/SUMMARY
[FreeTextEntry1] : The patient is a 28 yo woman with hx of sx consistent with diagnosis of MDD, and DARIA, on Lexapro 5 mg/day with good effect, reports increased anxiety since NOV, 2021 since she started a job that she boateng snot like.  5/23/2022: Patient reports she continues to feel anxious, and no side effects from Lexapro, will increase dose.   6/21/2022: Patient reports slight reduced of anxiety sx and is hopeful that in summer and once she starts new job she will be less anxious. We will continue same dose of Lexapro and monitor for sx stability.   09/27/2022: Patient reported significant improvement of anxiety symptoms since last visit, did not need to increase the Lexapro dose from 10 to 15 mg as discussed previously.   1/18/2023: Patient reported that for the past 2 weeks she started feeling anxious and having some trouble sleeping and getting upset easily though triggered by recent interpersonal stressor it appears that some of the symptoms have been coming back and we discussed increasing the Lexapro dose could help reduce the intensity of these symptoms so she can continue to learn coping skills in therapy to manage the stressors.   3/22/2023: Patient reported increasing the Lexapro dose has helped with the anxiety symptoms she was experiencing and also she realized that triggers that are causing increasing the anxiety and that she is working with her therapist learning coping skills.  5/2/2023: Patient states that she felt she was doing okay with the last dose increase with the Lexapro but for the past 2 weeks anticipating the work she has to do and on the day of the art show how little time she has she has been feeling more overwhelmed and anxious  5/15/2023: Patient reported that she did not have Klonopin until yesterday and she took half a tablet without much benefit but also did not feel tired or fatigued with a half a tablet.  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  6/19/2023: Patient continues to remain depressed and anxious with the anxiety symptoms at 8 out of 10, 10 being the worst despite being on full dose of Lexapro 20 mg daily and buspirone 20 mg daily and at this time would warrant switching to a different medication.  8/7/2023: Patient reported improving symptoms of depression and anxiety and so far has been tolerating venlafaxine without adverse effects.   09/27/2023: The patient reported improving symptoms of depression and anxiety, no adverse effects from venlafaxine. she is still getting distracted and starts a task and either gets distracted when she interrupted by a student or when she sees another thing she moves on and forgets the previous task that she was doing. She reported she had trouble focusing since childhood and the Adult ADHD screen questionnaire she completed with therapist meets threshold for ADHD.   11/14/2023: Patient reported sustained improvement of symptoms of depression, anxiety symptoms fluctuate with interpersonal stressors and conflict and stress at work mostly the past week otherwise she has been doing good and also even in the past week she feels that she has been is able to use coping skills effectively to cope with the stressors and learn to set boundaries with her mother.  Patient reported she continues to have trouble focusing and gets distracted easily not completing tasks and also not focusing on conversations, Vyvanse was giving her headaches so she stopped taking it she also has TMJ pain will be getting an guard to help her with the pain.  12/20/2023: The patient states even with Concerta she experienced increased heart rate and is no longer taking it and prefers a trial with non-stimulant for ADHD, and she feels her depression and general anxiety symptoms are in good control.  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  Patient had increased heart rate with Concerta and prefer to use use a nonstimulant but not finding it effective, and agreeable to trial with Adderall.  2/27/2024: Patient reported that taking Adderall only once a day before she came down with sinus infection which has been continuous and not resolving so she did not want to take Adderall when she had headaches and nasal congestion and 1 day that she took she experienced a slight increase in heart rate but overall tolerated it well.  Patient's concerns about risk versus benefits of different medication since she is currently prescribed on the pregnancy were discussed and advised patient to think more about her preferences and risks and benefits and discussed those with the OB/GYN as well and we can plan med changes during pregnancy and postpartum period accordingly.  4/9/2024: Patient did not tolerate Adderall and stopped taking it.  Depression and anxiety symptoms are under good control with the current medications however she continues to have trouble focusing and would like to try another stimulant but states that when she is pregnant she would like to avoid that and understands the risks and benefits of taking other psychotropic medications during pregnancy.  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  8/7/2024: Patient is 6 weeks pregnant and experiencing nausea otherwise overall depression and anxiety symptoms continue to remain stable.  10/14/2024: The patient is now 18 weeks pregnant morning sickness improved overall depression anxiety symptoms continue to remain stable she reported hair pulling is now replaced by skin picking especially the scalp since she has developed some dandruff and we talked about different strategies including oncological but considering risk versus benefits advised patient to continue to work with her therapist and incorporate behavior changes to help manage these symptoms.  12/2/2024: Patient is 25 weeks pregnant.  Reports there is continuous skin picking of the scalp no change from last time continuing to work with the therapist and using different strategies to distract herself from skin picking.  1/17/2025: Patient's overall status is relatively stable with the exception of heightened anxiety particularly centered around scratching the scalp. Current medication is Doxylamine for insomnia and venlafaxine .5 mg/day. We discussed the possibility of either maintaining venlafaxine postpartum depending on anxiety symptoms or changing to Zoloft should the patient decide to breastfeed, as this is not secreted in breast milk. She will review with  and MFM specialist to decide if to continue same, vs switch to Zoloft so she can breastfeed her child without exposing to medications in breastmilk.   3/4/2025: The patient's pregnancy is near its end and she has maintained a good level of stability whilst expressing mild anxiety. As postpartum depression/anxiety is a potential risk, the continuation of the Venlafaxine (with approval from the OB-GYN) seems a suitable approach currently and patient also wishes to continue venlafaxine and not make any medication changes.  4/30/2025: Patient is 7 and half weeks postpartum reported slight irritability in the context of being the only caregiver and not being able to attend to personal needs when she wants to, but overall mood remains stable no pervasive sad mood or anhedonia, and anxiety symptoms are also much better controlled.  She reported 1 fleeting thoughts of harming the baby when she was feeling extremely overwhelmed, was able to distract herself and find ways to cope with those thoughts and she denied having any more thoughts of harming self or the baby since then

## 2025-04-30 NOTE — REASON FOR VISIT
[Patient preference] : as per patient preference [Telehealth (audio & video) - Individual/Group] : This visit was provided via telehealth using real-time 2-way audio visual technology. [Medical Office: ([facility])___] : The provider was located at the medical office in [unfilled]. [Other Location: e.g. Home (Enter Location, City,State)___] : The patient, [unfilled], was located at [unfilled] at the time of the visit. [Patient's space is appropriate for telehealth and maintains privacy/confidentiality.] : Patient's space is appropriate for telehealth and maintains privacy/confidentiality. [Participant(s) identity verified] : Participant(s) identity verified. [Verbal consent obtained from patient/other participant(s)] : Verbal consent for telehealth/telephonic services obtained from patient/other participant(s) [Patient] : Patient [FreeTextEntry1] : anxiety

## 2025-04-30 NOTE — PHYSICAL EXAM
[None] : none [Average] : average [Cooperative] : cooperative [Euthymic] : euthymic [Clear] : clear [Linear/Goal Directed] : linear/goal directed [None Reported] : none reported [WNL] : within normal limits [de-identified] : full range  [FreeTextEntry7] : No SI/HI

## 2025-04-30 NOTE — PLAN
[No] : No [Medication education provided] : Medication education provided. [Rationale for medication choices, possible risks/precautions, benefits, alternative treatment choices, and consequences of non-treatment discussed] : Rationale for medication choices, possible risks/precautions, benefits, alternative treatment choices, and consequences of non-treatment discussed with patient/family/caregiver  [FreeTextEntry5] : Psychoeducation provided:  Offered education about the potential risks of postpartum depression and anxiety. Supportive therapy: Continued supportive therapy with a focus on managing the scalp scratching behavior.  Will need to wait until she stops breast-feeding to consider N-acetylcysteine supplement. Continue venlafaxine extended release to 112.5 mg daily.  Educated patient of importance of remaining abstinent from drugs and alcohol.  Continue therapy-outside. Emergency procedures were discussed: pt. educated to call 911 or go to nearest ER for worsening of symptoms/suicidal/homicidal ideation.  A follow-up appointment suggested to be scheduled in 6 weeks. Patient given opportunity to ask questions and their questions were answered, and they expressed understanding and agreement with above plan.  | Reference #: [id number]  Practitioner Count: 0 Pharmacy Count: 0 Current Opioid Prescriptions: 0 Current Benzodiazepine Prescriptions: 0 Current Stimulant Prescriptions: 0   Patient Demographic Information (PDI)     PDI	First Name	Last Name	Birth Date	Gender	Street Address	[facility]	Zip Code [place]	03/07/1995	Female	[address] [address]  Prescription Information    PDI Filter:   PDI	My Rx	Current Rx	Drug Type	Rx Written	Rx Dispensed	Drug	Quantity	Days Supply	Prescriber Name	Prescriber SANDRA #	Payment Method	Dispenser A	Y	N	S	04/29/2024	05/23/2024	dexmethylphenidate er 5 mg cap	21	21	Bianca Portillo	[id number]	[id number]

## 2025-05-09 NOTE — HISTORY OF PRESENT ILLNESS
[FreeTextEntry1] : Selina is here for f/u visit. She recently gave birth to baby boy, Tonio, FTSVD on 3/15/25.  She attempted intercourse x 1 since delivery without success 2/2 pain. Reports severe vulvar pain. She is back in PFPT weekly with Doreen. She just started estradiol 1gm PV qhs x 2 weeks.  She is breastfeeding/pumping exclusively.

## 2025-05-09 NOTE — PHYSICAL EXAM
[No Acute Distress] : in no acute distress [Well developed] : well developed [Well Nourished] : ~L well nourished [Good Hygeine] : demonstrates good hygeine [Oriented x3] : oriented to person, place, and time [Normal Memory] : ~T memory was ~L unimpaired [Normal Mood/Affect] : mood and affect are normal [Respirations regular] : ~T respiratory rate was regular [No Edema] : ~T edema was not present [Warm and Dry] : was warm and dry to touch [Normal Gait] : gait was normal [No Lesions] : no lesions were seen on the external genitalia [Labia Majora] : were normal [Labia Minora] : were normal [Normal Appearance] : general appearance was normal [Pink Rugae] : pink rugae [No Bleeding] : there was no active vaginal bleeding [Normal] : no abnormalities [Exam Deferred] : was deferred [Tenderness] : ~T no ~M abdominal tenderness observed [Distended] : not distended [de-identified] : Q-tip touch test 8/10 @ 1,5,6,7,11 with reactive erythema at introitus. No fissures, ulcerations, erosions [FreeTextEntry4] : PFMs 2/4 with mild tenderness of b/l levators to palpation

## 2025-05-09 NOTE — DISCUSSION/SUMMARY
[FreeTextEntry1] : We discussed limited treatment options 2/2 breastfeeding exclusively.   d/c PV estradiol  EG5% topical cream ftp to introitus qhs. Vulvar health techniques rev'd in detail: -Warm baths x15-20 mins QD with 2 cups Epsom salt or Aveeno oatmeal -Unscented soaps, body washes, bath gels -No fabric softeners or dryer sheets on underwear -Unscented pads, tampons (NOT ALWAYS) -Ice to vulva -Hypoallergenic lubricants -White Crisco as needed  Continue PFPT with Doreen weekly. Continue HEP, stretching.  When Selina is finished breastfeeding, will start suppositories and possible oral meds.   RTO 6-8 weeks

## 2025-05-14 NOTE — HISTORY OF PRESENT ILLNESS
[FreeTextEntry1] : Pt is a 31yo  s/p  in March presenting today for Kyleena IUD insertion under ultrasound guidance. UCG neg.

## 2025-05-14 NOTE — PROCEDURE
[IUD Placement] : intrauterine device (IUD) placement [Time out performed] : Pre-procedure time out performed.  Patient's name, date of birth and procedure confirmed. [Consent Obtained] : Consent obtained [Prevention of Pregnancy] : prevention of pregnancy [Risks] : risks [Benefits] : benefits [Alternatives] : alternatives [Patient] : patient [Infection] : infection [Bleeding] : bleeding [Pain] : pain [Expulsion] : expulsion [Failure] : failure [Uterine Perforation] : uterine perforation [Neg Pregnancy Test] : negative pregnancy test [No Premedication] : No premedication [Easy Passage] : Easy passage [Kyleena IUD] : Kyleena IUD [Tolerated Well] : Patient tolerated the procedure well [No Complications] : No complications [None] : None [de-identified] : [id number] [de-identified] : 1/2027 [de-identified] : 5/14/2033

## 2025-05-14 NOTE — PLAN
[FreeTextEntry1] : Kyleena IUD Placement: UCG negative D/W Pt R/B/A of IUD including but not limited to expulsion, migration, embedment, increased risk of infection, PID, less than 1% failure rate, increase risk of ectopic if failure occurs Should expect spotting over the next 3-6 months Call MD if fever, severe pain, heavy VB Follow up in office in 1 month for sono & string check

## 2025-05-21 NOTE — PHYSICAL EXAM
[EOMI] : extra ocular movement intact [Sclera nonicteric] : sclera nonicteric [Supple] : supple [No Supraclavicular Adenopathy] : no supraclavicular adenopathy [No Cervical Adenopathy] : no cervical adenopathy [No Thyromegaly] : no thyromegaly [Clear to Auscultation Bilat] : clear to auscultation bilaterally [Examined in the supine and seated position] : examined in the supine and seated position [No dominant masses] : no dominant masses in right breast  [No Nipple Retraction] : no left nipple retraction [No Nipple Discharge] : no left nipple discharge [No Axillary Lymphadenopathy] : no left axillary lymphadenopathy [Soft] : abdomen soft [de-identified] : Circumareolar scar. 1.5 oval mobile mass 4-5:00 N3. [de-identified] : Gravid

## 2025-05-21 NOTE — PHYSICAL EXAM
[EOMI] : extra ocular movement intact [Sclera nonicteric] : sclera nonicteric [Supple] : supple [No Supraclavicular Adenopathy] : no supraclavicular adenopathy [No Cervical Adenopathy] : no cervical adenopathy [No Thyromegaly] : no thyromegaly [Clear to Auscultation Bilat] : clear to auscultation bilaterally [Examined in the supine and seated position] : examined in the supine and seated position [No dominant masses] : no dominant masses in right breast  [No Nipple Retraction] : no left nipple retraction [No Nipple Discharge] : no left nipple discharge [No Axillary Lymphadenopathy] : no left axillary lymphadenopathy [Soft] : abdomen soft [de-identified] : Circumareolar scar. 1.5 oval mobile mass 4-5:00 N3. [de-identified] : Gravid

## 2025-05-21 NOTE — CONSULT LETTER
[Dear  ___] : Dear  [unfilled], [Courtesy Letter:] : I had the pleasure of seeing your patient, [unfilled], in my office today. [Sincerely,] : Sincerely, [DrTrinity  ___] : Dr. STRATTON

## 2025-05-21 NOTE — PROCEDURE
[FreeTextEntry1] : Left breast ultrasound [FreeTextEntry2] : Assess lesion [FreeTextEntry3] : The left 4-5:00 breast shows an iso-hypoechoic mass 66z06m5 mm stable from 1/2025.

## 2025-05-21 NOTE — DATA REVIEWED
[FreeTextEntry1] : Bilateral breast ultrasound 1/29/2025:  Left 5N4 increase in mass 5N4 09j66m14 mm ( 29y4b03 mm) rec biopsy                                                                       10N5 5x3x3 mm stable                                                                        11N3 9x6x11 mm slightly smaller                                                               Right 9RA 10x5x4 mm cyst  Bilateral breast MRI 3/19/2024:  No MRI evidence of malignancy. Stable previously biopsied nodule left UIQ 12 mm.

## 2025-05-21 NOTE — PAST MEDICAL HISTORY
[Menarche Age ____] : age at menarche was [unfilled] [Total Preg ___] : G[unfilled] [Live Births ___] : P[unfilled]  [Age At Live Birth ___] : Age at live birth: [unfilled] [FreeTextEntry2] : son

## 2025-05-21 NOTE — PROCEDURE
[FreeTextEntry1] : Left breast ultrasound [FreeTextEntry2] : Assess lesion [FreeTextEntry3] : The left 4-5:00 breast shows an iso-hypoechoic mass 53z24p6 mm stable from 1/2025.

## 2025-05-21 NOTE — HISTORY OF PRESENT ILLNESS
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  She had a left breast fibroadenoma documented by core biopsy in 1/2019 (11N3).  She was found to have a new left breast lump (4N3) on her routine exam in March 2023.  Core biopsy in 10/2023 showed fibroadenomatoid change and PASH.  She was seen in January when she was 33 weeks pregnant.  Breast ultrasound after that visit showed the left 4-5:00 mass had increased and a repeat biopsy was recommended.  She opted to follow-up after she delivered.  Her son was born on March 10.  She has been pumping and would like to continue for at least 6 months.  She had an IUD reinserted.

## 2025-05-21 NOTE — DATA REVIEWED
[FreeTextEntry1] : Bilateral breast ultrasound 1/29/2025:  Left 5N4 increase in mass 5N4 96i72k03 mm ( 85o9u35 mm) rec biopsy                                                                       10N5 5x3x3 mm stable                                                                        11N3 9x6x11 mm slightly smaller                                                               Right 9RA 10x5x4 mm cyst  Bilateral breast MRI 3/19/2024:  No MRI evidence of malignancy. Stable previously biopsied nodule left UIQ 12 mm.

## 2025-06-19 NOTE — HISTORY OF PRESENT ILLNESS
[FreeTextEntry1] : Selina is here for f/u visit.  She has not yet attempted intercourse since her last visit. She is using topical EG5% to introitus qhs with good results.  She is back in PFPT weekly with Doreen. She is breastfeeding/pumping exclusively. She would like to breastfeed until 6 months. She recently found mass in L breast. She is going to have bx done.

## 2025-06-19 NOTE — PHYSICAL EXAM
[No Acute Distress] : in no acute distress [Well developed] : well developed [Well Nourished] : ~L well nourished [Good Hygeine] : demonstrates good hygeine [Oriented x3] : oriented to person, place, and time [Normal Memory] : ~T memory was ~L unimpaired [Normal Mood/Affect] : mood and affect are normal [Respirations regular] : ~T respiratory rate was regular [No Edema] : ~T edema was not present [Warm and Dry] : was warm and dry to touch [Normal Gait] : gait was normal [No Lesions] : no lesions were seen on the external genitalia [Labia Majora] : were normal [Labia Minora] : were normal [Normal Appearance] : general appearance was normal [Pink Rugae] : pink rugae [No Bleeding] : there was no active vaginal bleeding [Normal] : no abnormalities [Exam Deferred] : was deferred [Tenderness] : ~T no ~M abdominal tenderness observed [Distended] : not distended [de-identified] : Q-tip touch test 6/10 @ 1,5 and 4/10 @ 6,7,11 with reactive erythema at introitus. No fissures, ulcerations, erosions [FreeTextEntry4] : PFMs 2/4 (L>R) with MTrP L PC/IC, +pain to palpation

## 2025-06-22 NOTE — REASON FOR VISIT
[Patient preference] : as per patient preference [Telehealth (audio & video) - Individual/Group] : This visit was provided via telehealth using real-time 2-way audio visual technology. [Medical Office: ([facility])___] : The provider was located at the medical office in [unfilled]. [Other Location: e.g. Home (Enter Location, City,State)___] : The patient, [unfilled], was located at [unfilled] at the time of the visit. [Patient's space is appropriate for telehealth and maintains privacy/confidentiality.] : Patient's space is appropriate for telehealth and maintains privacy/confidentiality. [Participant(s) identity verified] : Participant(s) identity verified. [Verbal consent obtained from patient/other participant(s)] : Verbal consent for telehealth/telephonic services obtained from patient/other participant(s) [Patient] : Patient [FreeTextEntry1] : anxiety

## 2025-06-22 NOTE — DISCUSSION/SUMMARY
[FreeTextEntry1] : The patient is a 26 yo woman with hx of sx consistent with diagnosis of MDD, and DARIA, on Lexapro 5 mg/day with good effect, reports increased anxiety since NOV, 2021 since she started a job that she boateng snot like.  5/23/2022: Patient reports she continues to feel anxious, and no side effects from Lexapro, will increase dose.   6/21/2022: Patient reports slight reduced of anxiety sx and is hopeful that in summer and once she starts new job she will be less anxious. We will continue same dose of Lexapro and monitor for sx stability.   09/27/2022: Patient reported significant improvement of anxiety symptoms since last visit, did not need to increase the Lexapro dose from 10 to 15 mg as discussed previously.   1/18/2023: Patient reported that for the past 2 weeks she started feeling anxious and having some trouble sleeping and getting upset easily though triggered by recent interpersonal stressor it appears that some of the symptoms have been coming back and we discussed increasing the Lexapro dose could help reduce the intensity of these symptoms so she can continue to learn coping skills in therapy to manage the stressors.   3/22/2023: Patient reported increasing the Lexapro dose has helped with the anxiety symptoms she was experiencing and also she realized that triggers that are causing increasing the anxiety and that she is working with her therapist learning coping skills.  5/2/2023: Patient states that she felt she was doing okay with the last dose increase with the Lexapro but for the past 2 weeks anticipating the work she has to do and on the day of the art show how little time she has she has been feeling more overwhelmed and anxious  5/15/2023: Patient reported that she did not have Klonopin until yesterday and she took half a tablet without much benefit but also did not feel tired or fatigued with a half a tablet.  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  6/19/2023: Patient continues to remain depressed and anxious with the anxiety symptoms at 8 out of 10, 10 being the worst despite being on full dose of Lexapro 20 mg daily and buspirone 20 mg daily and at this time would warrant switching to a different medication.  8/7/2023: Patient reported improving symptoms of depression and anxiety and so far has been tolerating venlafaxine without adverse effects.   09/27/2023: The patient reported improving symptoms of depression and anxiety, no adverse effects from venlafaxine. she is still getting distracted and starts a task and either gets distracted when she interrupted by a student or when she sees another thing she moves on and forgets the previous task that she was doing. She reported she had trouble focusing since childhood and the Adult ADHD screen questionnaire she completed with therapist meets threshold for ADHD.   11/14/2023: Patient reported sustained improvement of symptoms of depression, anxiety symptoms fluctuate with interpersonal stressors and conflict and stress at work mostly the past week otherwise she has been doing good and also even in the past week she feels that she has been is able to use coping skills effectively to cope with the stressors and learn to set boundaries with her mother.  Patient reported she continues to have trouble focusing and gets distracted easily not completing tasks and also not focusing on conversations, Vyvanse was giving her headaches so she stopped taking it she also has TMJ pain will be getting an guard to help her with the pain.  12/20/2023: The patient states even with Concerta she experienced increased heart rate and is no longer taking it and prefers a trial with non-stimulant for ADHD, and she feels her depression and general anxiety symptoms are in good control.  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  Patient had increased heart rate with Concerta and prefer to use use a nonstimulant but not finding it effective, and agreeable to trial with Adderall.  2/27/2024: Patient reported that taking Adderall only once a day before she came down with sinus infection which has been continuous and not resolving so she did not want to take Adderall when she had headaches and nasal congestion and 1 day that she took she experienced a slight increase in heart rate but overall tolerated it well.  Patient's concerns about risk versus benefits of different medication since she is currently prescribed on the pregnancy were discussed and advised patient to think more about her preferences and risks and benefits and discussed those with the OB/GYN as well and we can plan med changes during pregnancy and postpartum period accordingly.  4/9/2024: Patient did not tolerate Adderall and stopped taking it.  Depression and anxiety symptoms are under good control with the current medications however she continues to have trouble focusing and would like to try another stimulant but states that when she is pregnant she would like to avoid that and understands the risks and benefits of taking other psychotropic medications during pregnancy.  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  8/7/2024: Patient is 6 weeks pregnant and experiencing nausea otherwise overall depression and anxiety symptoms continue to remain stable.  10/14/2024: The patient is now 18 weeks pregnant morning sickness improved overall depression anxiety symptoms continue to remain stable she reported hair pulling is now replaced by skin picking especially the scalp since she has developed some dandruff and we talked about different strategies including oncological but considering risk versus benefits advised patient to continue to work with her therapist and incorporate behavior changes to help manage these symptoms.  12/2/2024: Patient is 25 weeks pregnant.  Reports there is continuous skin picking of the scalp no change from last time continuing to work with the therapist and using different strategies to distract herself from skin picking.  1/17/2025: Patient's overall status is relatively stable with the exception of heightened anxiety particularly centered around scratching the scalp. Current medication is Doxylamine for insomnia and venlafaxine .5 mg/day. We discussed the possibility of either maintaining venlafaxine postpartum depending on anxiety symptoms or changing to Zoloft should the patient decide to breastfeed, as this is not secreted in breast milk. She will review with  and MFM specialist to decide if to continue same, vs switch to Zoloft so she can breastfeed her child without exposing to medications in breastmilk.   3/4/2025: The patient's pregnancy is near its end and she has maintained a good level of stability whilst expressing mild anxiety. As postpartum depression/anxiety is a potential risk, the continuation of the Venlafaxine (with approval from the OB-GYN) seems a suitable approach currently and patient also wishes to continue venlafaxine and not make any medication changes.  4/30/2025: Patient is 7 and half weeks postpartum reported slight irritability in the context of being the only caregiver and not being able to attend to personal needs when she wants to, but overall mood remains stable no pervasive sad mood or anhedonia, and anxiety symptoms are also much better controlled.  She reported 1 fleeting thoughts of harming the baby when she was feeling extremely overwhelmed, was able to distract herself and find ways to cope with those thoughts and she denied having any more thoughts of harming self or the baby since then  6/11/2025: Patient is a postpartum mother managing well overall with occasional moments of feeling overwhelmed. Sleep has improved slightly but remains interrupted. Mood is generally stable with no significant worsening of depression or anxiety symptoms. Patient is preparing for a brief return to work before summer break. She prefers to continue with current medication regumen.

## 2025-06-22 NOTE — REASON FOR VISIT
[Patient preference] : as per patient preference [Telehealth (audio & video) - Individual/Group] : This visit was provided via telehealth using real-time 2-way audio visual technology. [Medical Office: ([facility])___] : The provider was located at the medical office in [unfilled]. [Other Location: e.g. Home (Enter Location, City,State)___] : The patient, [unfilled], was located at [unfilled] at the time of the visit. [Patient's space is appropriate for telehealth and maintains privacy/confidentiality.] : Patient's space is appropriate for telehealth and maintains privacy/confidentiality. [Participant(s) identity verified] : Participant(s) identity verified. [Verbal consent obtained from patient/other participant(s)] : Verbal consent for telehealth/telephonic services obtained from patient/other participant(s) [Patient] : Patient [FreeTextEntry1] : anxiety

## 2025-06-22 NOTE — PLAN
[No] : No [Medication education provided] : Medication education provided. [Rationale for medication choices, possible risks/precautions, benefits, alternative treatment choices, and consequences of non-treatment discussed] : Rationale for medication choices, possible risks/precautions, benefits, alternative treatment choices, and consequences of non-treatment discussed with patient/family/caregiver  [FreeTextEntry5] : Psychoeducation provided:  Discussed the challenges of returning to work while breastfeeding and managing childcare responsibilities. Supportive therapy: Provided supportive listening and validation of patient's experiences and concerns. Encouraged patient to continue engaging in self-care activities when possible. Advised patient to try to sleep when the baby sleeps to maximize rest opportunities. Discussed the importance of maintaining regular meal times and staying hydrated, especially while breastfeeding. Continue venlafaxine extended release to 112.5 mg daily.  Educated patient of importance of remaining abstinent from drugs and alcohol.  Continue therapy-outside. Emergency procedures were discussed: pt. educated to call 911 or go to nearest ER for worsening of symptoms/suicidal/homicidal ideation.  A follow-up appointment suggested to be scheduled in 6 weeks. Patient given opportunity to ask questions and their questions were answered, and they expressed understanding and agreement with above plan.   I stop checked, no controlled substance Rx in past 12 months: Reference #: [id number]

## 2025-06-22 NOTE — PHYSICAL EXAM
[None] : none [Average] : average [Cooperative] : cooperative [Euthymic] : euthymic [Clear] : clear [Linear/Goal Directed] : linear/goal directed [None Reported] : none reported [WNL] : within normal limits [de-identified] : full range  [FreeTextEntry7] : No SI/HI

## 2025-06-22 NOTE — PHYSICAL EXAM
[None] : none [Average] : average [Cooperative] : cooperative [Euthymic] : euthymic [Clear] : clear [Linear/Goal Directed] : linear/goal directed [None Reported] : none reported [WNL] : within normal limits [de-identified] : full range  [FreeTextEntry7] : No SI/HI

## 2025-06-22 NOTE — REASON FOR VISIT
[Patient preference] : as per patient preference [Telehealth (audio & video) - Individual/Group] : This visit was provided via telehealth using real-time 2-way audio visual technology. [Medical Office: ([facility])___] : The provider was located at the medical office in [unfilled]. [Other Location: e.g. Home (Enter Location, City,State)___] : The patient, [unfilled], was located at [unfilled] at the time of the visit. [Patient's space is appropriate for telehealth and maintains privacy/confidentiality.] : Patient's space is appropriate for telehealth and maintains privacy/confidentiality. [Participant(s) identity verified] : Participant(s) identity verified. [Verbal consent obtained from patient/other participant(s)] : Verbal consent for telehealth/telephonic services obtained from patient/other participant(s) [Patient] : Patient [FreeTextEntry1] : anxiety

## 2025-06-22 NOTE — PLAN
[No] : No [Medication education provided] : Medication education provided. [Rationale for medication choices, possible risks/precautions, benefits, alternative treatment choices, and consequences of non-treatment discussed] : Rationale for medication choices, possible risks/precautions, benefits, alternative treatment choices, and consequences of non-treatment discussed with patient/family/caregiver  [FreeTextEntry5] : Psychoeducation provided:  Discussed the challenges of returning to work while breastfeeding and managing childcare responsibilities. Supportive therapy: Provided supportive listening and validation of patient's experiences and concerns. Encouraged patient to continue engaging in self-care activities when possible. Advised patient to try to sleep when the baby sleeps to maximize rest opportunities. Discussed the importance of maintaining regular meal times and staying hydrated, especially while breastfeeding. Continue venlafaxine extended release to 112.5 mg daily.  Educated patient of importance of remaining abstinent from drugs and alcohol.  Continue therapy-outside. Emergency procedures were discussed: pt. educated to call 911 or go to nearest ER for worsening of symptoms/suicidal/homicidal ideation.  A follow-up appointment suggested to be scheduled in 6 weeks. Patient given opportunity to ask questions and their questions were answered, and they expressed understanding and agreement with above plan.   I stop checked, no controlled substance Rx in past 12 months: Reference #: [id number]

## 2025-06-22 NOTE — PHYSICAL EXAM
[None] : none [Average] : average [Cooperative] : cooperative [Euthymic] : euthymic [Clear] : clear [Linear/Goal Directed] : linear/goal directed [None Reported] : none reported [WNL] : within normal limits [de-identified] : full range  [FreeTextEntry7] : No SI/HI

## 2025-06-22 NOTE — PHYSICAL EXAM
[None] : none [Average] : average [Cooperative] : cooperative [Euthymic] : euthymic [Clear] : clear [Linear/Goal Directed] : linear/goal directed [None Reported] : none reported [WNL] : within normal limits [de-identified] : full range  [FreeTextEntry7] : No SI/HI

## 2025-06-22 NOTE — PLAN
[No] : No [Medication education provided] : Medication education provided. [Rationale for medication choices, possible risks/precautions, benefits, alternative treatment choices, and consequences of non-treatment discussed] : Rationale for medication choices, possible risks/precautions, benefits, alternative treatment choices, and consequences of non-treatment discussed with patient/family/caregiver  [FreeTextEntry5] : Psychoeducation provided:  Discussed the challenges of returning to work while breastfeeding and managing childcare responsibilities. Supportive therapy: Provided supportive listening and validation of patient's experiences and concerns. Encouraged patient to continue engaging in self-care activities when possible. Advised patient to try to sleep when the baby sleeps to maximize rest opportunities. Discussed the importance of maintaining regular meal times and staying hydrated, especially while breastfeeding. Continue venlafaxine extended release to 112.5 mg daily.  Educated patient of importance of remaining abstinent from drugs and alcohol.  Continue therapy-outside. Emergency procedures were discussed: pt. educated to call 911 or go to nearest ER for worsening of symptoms/suicidal/homicidal ideation.  A follow-up appointment suggested to be scheduled in 6 weeks. Patient given opportunity to ask questions and their questions were answered, and they expressed understanding and agreement with above plan.   I stop checked, no controlled substance Rx in past 12 months: Reference #: [id number]

## 2025-06-22 NOTE — DISCUSSION/SUMMARY
[FreeTextEntry1] : The patient is a 28 yo woman with hx of sx consistent with diagnosis of MDD, and DARIA, on Lexapro 5 mg/day with good effect, reports increased anxiety since NOV, 2021 since she started a job that she boateng snot like.  5/23/2022: Patient reports she continues to feel anxious, and no side effects from Lexapro, will increase dose.   6/21/2022: Patient reports slight reduced of anxiety sx and is hopeful that in summer and once she starts new job she will be less anxious. We will continue same dose of Lexapro and monitor for sx stability.   09/27/2022: Patient reported significant improvement of anxiety symptoms since last visit, did not need to increase the Lexapro dose from 10 to 15 mg as discussed previously.   1/18/2023: Patient reported that for the past 2 weeks she started feeling anxious and having some trouble sleeping and getting upset easily though triggered by recent interpersonal stressor it appears that some of the symptoms have been coming back and we discussed increasing the Lexapro dose could help reduce the intensity of these symptoms so she can continue to learn coping skills in therapy to manage the stressors.   3/22/2023: Patient reported increasing the Lexapro dose has helped with the anxiety symptoms she was experiencing and also she realized that triggers that are causing increasing the anxiety and that she is working with her therapist learning coping skills.  5/2/2023: Patient states that she felt she was doing okay with the last dose increase with the Lexapro but for the past 2 weeks anticipating the work she has to do and on the day of the art show how little time she has she has been feeling more overwhelmed and anxious  5/15/2023: Patient reported that she did not have Klonopin until yesterday and she took half a tablet without much benefit but also did not feel tired or fatigued with a half a tablet.  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  6/19/2023: Patient continues to remain depressed and anxious with the anxiety symptoms at 8 out of 10, 10 being the worst despite being on full dose of Lexapro 20 mg daily and buspirone 20 mg daily and at this time would warrant switching to a different medication.  8/7/2023: Patient reported improving symptoms of depression and anxiety and so far has been tolerating venlafaxine without adverse effects.   09/27/2023: The patient reported improving symptoms of depression and anxiety, no adverse effects from venlafaxine. she is still getting distracted and starts a task and either gets distracted when she interrupted by a student or when she sees another thing she moves on and forgets the previous task that she was doing. She reported she had trouble focusing since childhood and the Adult ADHD screen questionnaire she completed with therapist meets threshold for ADHD.   11/14/2023: Patient reported sustained improvement of symptoms of depression, anxiety symptoms fluctuate with interpersonal stressors and conflict and stress at work mostly the past week otherwise she has been doing good and also even in the past week she feels that she has been is able to use coping skills effectively to cope with the stressors and learn to set boundaries with her mother.  Patient reported she continues to have trouble focusing and gets distracted easily not completing tasks and also not focusing on conversations, Vyvanse was giving her headaches so she stopped taking it she also has TMJ pain will be getting an guard to help her with the pain.  12/20/2023: The patient states even with Concerta she experienced increased heart rate and is no longer taking it and prefers a trial with non-stimulant for ADHD, and she feels her depression and general anxiety symptoms are in good control.  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  Patient had increased heart rate with Concerta and prefer to use use a nonstimulant but not finding it effective, and agreeable to trial with Adderall.  2/27/2024: Patient reported that taking Adderall only once a day before she came down with sinus infection which has been continuous and not resolving so she did not want to take Adderall when she had headaches and nasal congestion and 1 day that she took she experienced a slight increase in heart rate but overall tolerated it well.  Patient's concerns about risk versus benefits of different medication since she is currently prescribed on the pregnancy were discussed and advised patient to think more about her preferences and risks and benefits and discussed those with the OB/GYN as well and we can plan med changes during pregnancy and postpartum period accordingly.  4/9/2024: Patient did not tolerate Adderall and stopped taking it.  Depression and anxiety symptoms are under good control with the current medications however she continues to have trouble focusing and would like to try another stimulant but states that when she is pregnant she would like to avoid that and understands the risks and benefits of taking other psychotropic medications during pregnancy.  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  8/7/2024: Patient is 6 weeks pregnant and experiencing nausea otherwise overall depression and anxiety symptoms continue to remain stable.  10/14/2024: The patient is now 18 weeks pregnant morning sickness improved overall depression anxiety symptoms continue to remain stable she reported hair pulling is now replaced by skin picking especially the scalp since she has developed some dandruff and we talked about different strategies including oncological but considering risk versus benefits advised patient to continue to work with her therapist and incorporate behavior changes to help manage these symptoms.  12/2/2024: Patient is 25 weeks pregnant.  Reports there is continuous skin picking of the scalp no change from last time continuing to work with the therapist and using different strategies to distract herself from skin picking.  1/17/2025: Patient's overall status is relatively stable with the exception of heightened anxiety particularly centered around scratching the scalp. Current medication is Doxylamine for insomnia and venlafaxine .5 mg/day. We discussed the possibility of either maintaining venlafaxine postpartum depending on anxiety symptoms or changing to Zoloft should the patient decide to breastfeed, as this is not secreted in breast milk. She will review with  and MFM specialist to decide if to continue same, vs switch to Zoloft so she can breastfeed her child without exposing to medications in breastmilk.   3/4/2025: The patient's pregnancy is near its end and she has maintained a good level of stability whilst expressing mild anxiety. As postpartum depression/anxiety is a potential risk, the continuation of the Venlafaxine (with approval from the OB-GYN) seems a suitable approach currently and patient also wishes to continue venlafaxine and not make any medication changes.  4/30/2025: Patient is 7 and half weeks postpartum reported slight irritability in the context of being the only caregiver and not being able to attend to personal needs when she wants to, but overall mood remains stable no pervasive sad mood or anhedonia, and anxiety symptoms are also much better controlled.  She reported 1 fleeting thoughts of harming the baby when she was feeling extremely overwhelmed, was able to distract herself and find ways to cope with those thoughts and she denied having any more thoughts of harming self or the baby since then  6/11/2025: Patient is a postpartum mother managing well overall with occasional moments of feeling overwhelmed. Sleep has improved slightly but remains interrupted. Mood is generally stable with no significant worsening of depression or anxiety symptoms. Patient is preparing for a brief return to work before summer break. She prefers to continue with current medication regumen.

## 2025-06-22 NOTE — REASON FOR VISIT
Problem: Impaired Physical Mobility  Goal: # Bed mobility, ambulation, and ADLs are maintained or returned to baseline during hospitalization  1/24/2021 1114 by CECIL Hendricks  Flowsheets (Taken 1/24/2021 1114)  Egress Assessment:   Bed mobility:  Needs assist/lift/side rails  to move from supine to sit at edge of bed   Balance problems: Needs assist/support to maintain sitting balance  Activity:   Ambulated   Chair (all types)  Level of Assistance: Moderate assist  1/24/2021 1112 by CECIL Hendricks  Flowsheets (Taken 1/24/2021 0940 by Sumi Flaherty CNA)  Activity:   Stood at bedside   Ambulated   Chair (all types)  Level of Assistance: Minimal assist         [Patient preference] : as per patient preference [Telehealth (audio & video) - Individual/Group] : This visit was provided via telehealth using real-time 2-way audio visual technology. [Medical Office: ([facility])___] : The provider was located at the medical office in [unfilled]. [Other Location: e.g. Home (Enter Location, City,State)___] : The patient, [unfilled], was located at [unfilled] at the time of the visit. [Patient's space is appropriate for telehealth and maintains privacy/confidentiality.] : Patient's space is appropriate for telehealth and maintains privacy/confidentiality. [Participant(s) identity verified] : Participant(s) identity verified. [Verbal consent obtained from patient/other participant(s)] : Verbal consent for telehealth/telephonic services obtained from patient/other participant(s) [Patient] : Patient [FreeTextEntry1] : anxiety

## 2025-06-22 NOTE — PLAN
[No] : No [Medication education provided] : Medication education provided. [Rationale for medication choices, possible risks/precautions, benefits, alternative treatment choices, and consequences of non-treatment discussed] : Rationale for medication choices, possible risks/precautions, benefits, alternative treatment choices, and consequences of non-treatment discussed with patient/family/caregiver  [FreeTextEntry5] : Psychoeducation provided:  Discussed the challenges of returning to work while breastfeeding and managing childcare responsibilities. Supportive therapy: Provided supportive listening and validation of patient's experiences and concerns. Encouraged patient to continue engaging in self-care activities when possible. Advised patient to try to sleep when the baby sleeps to maximize rest opportunities. Discussed the importance of maintaining regular meal times and staying hydrated, especially while breastfeeding. Continue venlafaxine extended release to 112.5 mg daily.  Educated patient of importance of remaining abstinent from drugs and alcohol.  Continue therapy-outside. Emergency procedures were discussed: pt. educated to call 911 or go to nearest ER for worsening of symptoms/suicidal/homicidal ideation.  A follow-up appointment suggested to be scheduled in 6 weeks. Patient given opportunity to ask questions and their questions were answered, and they expressed understanding and agreement with above plan.   I stop checked, no controlled substance Rx in past 12 months: Reference #: [id number]

## 2025-06-22 NOTE — HISTORY OF PRESENT ILLNESS
[FreeTextEntry1] : Patient states she recently returned from a vacation in [place] with her , which provided more help with childcare. She reports some days are rougher than others due to lack of help for childcare, but overall  she feels she is managing well. Patient clarified states she stopped taking Doxylamine for sleep a week or two after giving birth due to difficulty waking up for the baby's needs. Patient reports generally stable mood with occasional moments of feeling overwhelmed. She experienced crying and feeling overwhelmed the night before the appointment due to a disrupted eating schedule and multiple tasks. Patient reports some anxiety related to returning to work, particularly concerns about being away from the baby and managing pumping at work. Sleep has improved slightly as the baby has been sleeping more, allowing the patient to get more rest. However, sleep is still interrupted by the baby's needs. Patient reports eating enough generally, but sometimes experiences longer intervals between meals than desired due to childcare responsibilities and appointments. No specific attention issues reported. Psychotic Symptoms: No psychotic symptoms reported or observed. No passive/active SI reported No thoughts of violence reported. Continued scalp scratching behavior.  Will need to wait until she stops breast-feeding to consider N-acetylcysteine supplement. Side effects from medications: No current side effects reported from medications. Adherence to medication: Patient reports she is adhering to current medication regimen. Substance use history: No substance use reported. Medical update: Patient reports a change in Pantoprazole dosage from twice daily to once daily. She has also switched to a different cream containing Estradiol and other ingredients prescribed by an NP at a pelvic floor clinic for better efficacy. Psychosocial history: Patient is preparing to return to work for two weeks before summer break. In-laws will be providing childcare during this period. Patient and  are looking into  options for the fall.

## 2025-07-03 NOTE — HISTORY OF PRESENT ILLNESS
[FreeTextEntry1] : Pt is a 31yo  presenting today for IUD check. Pt had continuous spotting x3 weeks s/p placement but has since had no issues. Denies pelvic pain. Using a specialized vaginal cream for intercourse in setting of dyspareunia that has been present since prior to IUD placement.  TVUS reviewed: kyleena IUD noted in appropriate location; EML 2.18mm

## 2025-07-03 NOTE — PLAN
[FreeTextEntry1] : #IUD check -IUD appears in proper location on TVUS & strings visualized on exam today -pt to RTO PRN

## 2025-07-08 NOTE — PROCEDURE
[FreeTextEntry1] : Left breast core needle biopsy with clip placement [FreeTextEntry2] : assess left breast mass [FreeTextEntry3] : The patient was placed in a right lateral decubitus position.  The left breast was prepped and draped.  Local 1% lidocaine was infiltrated (3cc).  A small nick was made in the skin.  From a lateral to medial approach, 2 cores were taken with a 14 g Celero device.  A twirl clip was placed.  Pressure was held.  The wound was closed with Steristrips and a telfa/Tegaderm dressing was applied.  The patient tolerated the procedure well.

## 2025-07-29 NOTE — PHYSICAL EXAM
[None] : none [Average] : average [Cooperative] : cooperative [Euthymic] : euthymic [Clear] : clear [Linear/Goal Directed] : linear/goal directed [None Reported] : none reported [WNL] : within normal limits [de-identified] : full range  [FreeTextEntry7] : No SI/HI

## 2025-07-29 NOTE — HISTORY OF PRESENT ILLNESS
[FreeTextEntry1] : The patient reported feeling stressed due to managing her recent medical and dental appointments and childcare. She recently had gum surgery and is undergoing dental procedures. Her mother is only sometimes helpful with childcare. The patient reports that depression-wise she is fine, but anxiety fluctuates between being bad and okay. Patient experiences fluctuating anxiety levels. When stressed, she reports getting panicky and scratching her head more. She sometimes feels very overwhelmed with childcare responsibilities. She states her mother is of little help, between her mother and her mother-in-law she gets some time needed for medical appointments. The patient reports sleeping okay because her child sleeps well at night. Patient reports being very hungry all the time due to breastfeeding, which she finds annoying. She reports no pervasive sad mood and or anhedonia and denied passive or active SI, and denied thoughts of harming baby.  Side effects from medications: none Adherence to medication: Venlafaxine is taken consistently without issues. Substance use history: No mention of substance use. Medical update: Patient has undergone gum surgery and is scheduled for dental procedures including getting teeth shaved down and temporaries placed. 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 She plans to return to work in September and is considering stopping breastfeeding by then due to difficulties with pumping at work and issues with her boss.

## 2025-07-29 NOTE — PLAN
[No] : No [Medication education provided] : Medication education provided. [Rationale for medication choices, possible risks/precautions, benefits, alternative treatment choices, and consequences of non-treatment discussed] : Rationale for medication choices, possible risks/precautions, benefits, alternative treatment choices, and consequences of non-treatment discussed with patient/family/caregiver  [FreeTextEntry5] : Psychoeducation provided: Discussed importance of consistent medication adherence and coping strategies for anxiety management. Supportive therapy: Provided supportive listening and validation of patient's experiences with postpartum stress and anxiety. Encouraged patient to continue engaging in self-care activities when possible. Advised patient to try to sleep when the baby sleeps to maximize rest opportunities. Discussed the importance of maintaining regular meal times and staying hydrated, especially while breastfeeding. Continue venlafaxine extended release to 112.5 mg daily.  Educated patient of importance of remaining abstinent from drugs and alcohol.  Continue therapy-outside. Emergency procedures were discussed: pt. educated to call 911 or go to nearest ER for worsening of symptoms/suicidal/homicidal ideation.  RTC in 6-8 weeks or earlier as needed.  Patient given opportunity to ask questions and their questions were answered, and they expressed understanding and agreement with above plan.   I stop checked, no controlled substance Rx in past 12 months: Reference #: [id number]

## 2025-07-29 NOTE — REASON FOR VISIT
[Patient preference] : as per patient preference [Telehealth (audio & video) - Individual/Group] : This visit was provided via telehealth using real-time 2-way audio visual technology. [Medical Office: ([facility])___] : The provider was located at the medical office in [unfilled]. [Patient's space is appropriate for telehealth and maintains privacy/confidentiality.] : Patient's space is appropriate for telehealth and maintains privacy/confidentiality. [Participant(s) identity verified] : Participant(s) identity verified. [Verbal consent obtained from patient/other participant(s)] : Verbal consent for telehealth/telephonic services obtained from patient/other participant(s) [Patient] : Patient [Home] : The patient, [unfilled], was located at home, [unfilled], at the time of the visit. [FreeTextEntry1] : anxiety

## 2025-07-29 NOTE — PHYSICAL EXAM
[None] : none [Average] : average [Cooperative] : cooperative [Euthymic] : euthymic [Clear] : clear [Linear/Goal Directed] : linear/goal directed [None Reported] : none reported [WNL] : within normal limits [de-identified] : full range  [FreeTextEntry7] : No SI/HI

## 2025-07-29 NOTE — PLAN
[No] : No [Medication education provided] : Medication education provided. [Rationale for medication choices, possible risks/precautions, benefits, alternative treatment choices, and consequences of non-treatment discussed] : Rationale for medication choices, possible risks/precautions, benefits, alternative treatment choices, and consequences of non-treatment discussed with patient/family/caregiver  [FreeTextEntry5] : Psychoeducation provided: Discussed importance of consistent medication adherence and coping strategies for anxiety management. Supportive therapy: Provided supportive listening and validation of patient's experiences with postpartum stress and anxiety. Encouraged patient to continue engaging in self-care activities when possible. Advised patient to try to sleep when the baby sleeps to maximize rest opportunities. Discussed the importance of maintaining regular meal times and staying hydrated, especially while breastfeeding. Continue venlafaxine extended release to 112.5 mg daily.  Educated patient of importance of remaining abstinent from drugs and alcohol.  Continue therapy-outside. Emergency procedures were discussed: pt. educated to call 911 or go to nearest ER for worsening of symptoms/suicidal/homicidal ideation.  RTC in 6-8 weeks or earlier as needed.  Patient given opportunity to ask questions and their questions were answered, and they expressed understanding and agreement with above plan.   I stop checked, no controlled substance Rx in past 12 months: Reference #: [id number]

## 2025-07-29 NOTE — REASON FOR VISIT
[Patient preference] : as per patient preference [Telehealth (audio & video) - Individual/Group] : This visit was provided via telehealth using real-time 2-way audio visual technology. [Medical Office: ([facility])___] : The provider was located at the medical office in [unfilled]. [Patient's space is appropriate for telehealth and maintains privacy/confidentiality.] : Patient's space is appropriate for telehealth and maintains privacy/confidentiality. [Participant(s) identity verified] : Participant(s) identity verified. [Verbal consent obtained from patient/other participant(s)] : Verbal consent for telehealth/telephonic services obtained from patient/other participant(s) [Patient] : Patient [Home] : The patient, [unfilled], was located at home, [unfilled], at the time of the visit. [FreeTextEntry1] : anxiety

## 2025-07-29 NOTE — DISCUSSION/SUMMARY
[FreeTextEntry1] : The patient is a 28 yo woman with hx of sx consistent with diagnosis of MDD, and DARIA, on Lexapro 5 mg/day with good effect, reports increased anxiety since NOV, 2021 since she started a job that she boateng snot like.  5/23/2022: Patient reports she continues to feel anxious, and no side effects from Lexapro, will increase dose.   6/21/2022: Patient reports slight reduced of anxiety sx and is hopeful that in summer and once she starts new job she will be less anxious. We will continue same dose of Lexapro and monitor for sx stability.   09/27/2022: Patient reported significant improvement of anxiety symptoms since last visit, did not need to increase the Lexapro dose from 10 to 15 mg as discussed previously.   1/18/2023: Patient reported that for the past 2 weeks she started feeling anxious and having some trouble sleeping and getting upset easily though triggered by recent interpersonal stressor it appears that some of the symptoms have been coming back and we discussed increasing the Lexapro dose could help reduce the intensity of these symptoms so she can continue to learn coping skills in therapy to manage the stressors.   3/22/2023: Patient reported increasing the Lexapro dose has helped with the anxiety symptoms she was experiencing and also she realized that triggers that are causing increasing the anxiety and that she is working with her therapist learning coping skills.  5/2/2023: Patient states that she felt she was doing okay with the last dose increase with the Lexapro but for the past 2 weeks anticipating the work she has to do and on the day of the art show how little time she has she has been feeling more overwhelmed and anxious  5/15/2023: Patient reported that she did not have Klonopin until yesterday and she took half a tablet without much benefit but also did not feel tired or fatigued with a half a tablet.  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  6/19/2023: Patient continues to remain depressed and anxious with the anxiety symptoms at 8 out of 10, 10 being the worst despite being on full dose of Lexapro 20 mg daily and buspirone 20 mg daily and at this time would warrant switching to a different medication.  8/7/2023: Patient reported improving symptoms of depression and anxiety and so far has been tolerating venlafaxine without adverse effects.   09/27/2023: The patient reported improving symptoms of depression and anxiety, no adverse effects from venlafaxine. she is still getting distracted and starts a task and either gets distracted when she interrupted by a student or when she sees another thing she moves on and forgets the previous task that she was doing. She reported she had trouble focusing since childhood and the Adult ADHD screen questionnaire she completed with therapist meets threshold for ADHD.   11/14/2023: Patient reported sustained improvement of symptoms of depression, anxiety symptoms fluctuate with interpersonal stressors and conflict and stress at work mostly the past week otherwise she has been doing good and also even in the past week she feels that she has been is able to use coping skills effectively to cope with the stressors and learn to set boundaries with her mother.  Patient reported she continues to have trouble focusing and gets distracted easily not completing tasks and also not focusing on conversations, Vyvanse was giving her headaches so she stopped taking it she also has TMJ pain will be getting an guard to help her with the pain.  12/20/2023: The patient states even with Concerta she experienced increased heart rate and is no longer taking it and prefers a trial with non-stimulant for ADHD, and she feels her depression and general anxiety symptoms are in good control.  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  Patient had increased heart rate with Concerta and prefer to use use a nonstimulant but not finding it effective, and agreeable to trial with Adderall.  2/27/2024: Patient reported that taking Adderall only once a day before she came down with sinus infection which has been continuous and not resolving so she did not want to take Adderall when she had headaches and nasal congestion and 1 day that she took she experienced a slight increase in heart rate but overall tolerated it well.  Patient's concerns about risk versus benefits of different medication since she is currently prescribed on the pregnancy were discussed and advised patient to think more about her preferences and risks and benefits and discussed those with the OB/GYN as well and we can plan med changes during pregnancy and postpartum period accordingly.  4/9/2024: Patient did not tolerate Adderall and stopped taking it.  Depression and anxiety symptoms are under good control with the current medications however she continues to have trouble focusing and would like to try another stimulant but states that when she is pregnant she would like to avoid that and understands the risks and benefits of taking other psychotropic medications during pregnancy.  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  8/7/2024: Patient is 6 weeks pregnant and experiencing nausea otherwise overall depression and anxiety symptoms continue to remain stable.  10/14/2024: The patient is now 18 weeks pregnant morning sickness improved overall depression anxiety symptoms continue to remain stable she reported hair pulling is now replaced by skin picking especially the scalp since she has developed some dandruff and we talked about different strategies including oncological but considering risk versus benefits advised patient to continue to work with her therapist and incorporate behavior changes to help manage these symptoms.  12/2/2024: Patient is 25 weeks pregnant.  Reports there is continuous skin picking of the scalp no change from last time continuing to work with the therapist and using different strategies to distract herself from skin picking.  1/17/2025: Patient's overall status is relatively stable with the exception of heightened anxiety particularly centered around scratching the scalp. Current medication is Doxylamine for insomnia and venlafaxine .5 mg/day. We discussed the possibility of either maintaining venlafaxine postpartum depending on anxiety symptoms or changing to Zoloft should the patient decide to breastfeed, as this is not secreted in breast milk. She will review with  and MFM specialist to decide if to continue same, vs switch to Zoloft so she can breastfeed her child without exposing to medications in breastmilk.   3/4/2025: The patient's pregnancy is near its end and she has maintained a good level of stability whilst expressing mild anxiety. As postpartum depression/anxiety is a potential risk, the continuation of the Venlafaxine (with approval from the OB-GYN) seems a suitable approach currently and patient also wishes to continue venlafaxine and not make any medication changes.  4/30/2025: Patient is 7 and half weeks postpartum reported slight irritability in the context of being the only caregiver and not being able to attend to personal needs when she wants to, but overall mood remains stable no pervasive sad mood or anhedonia, and anxiety symptoms are also much better controlled.  She reported 1 fleeting thoughts of harming the baby when she was feeling extremely overwhelmed, was able to distract herself and find ways to cope with those thoughts and she denied having any more thoughts of harming self or the baby since then  6/11/2025: Patient is a postpartum mother managing well overall with occasional moments of feeling overwhelmed. Sleep has improved slightly but remains interrupted. Mood is generally stable with no significant worsening of depression or anxiety symptoms. Patient is preparing for a brief return to work before summer break. She prefers to continue with current medication regumen.  07/21/2025: The patient is a postpartum mother experiencing fluctuating anxiety levels and stress related to childcare and medical appointments. She states she is managing anxiety through breathing techniques and communication with her . Skin picking has reduced slightly. She plans to wean off breastfeeding before returning to work in September.